# Patient Record
Sex: FEMALE | Race: WHITE | Employment: UNEMPLOYED | URBAN - METROPOLITAN AREA
[De-identification: names, ages, dates, MRNs, and addresses within clinical notes are randomized per-mention and may not be internally consistent; named-entity substitution may affect disease eponyms.]

---

## 2018-11-06 ENCOUNTER — HOSPITAL ENCOUNTER (EMERGENCY)
Age: 23
Discharge: HOME | End: 2018-11-07
Attending: EMERGENCY MEDICINE
Payer: COMMERCIAL

## 2018-11-06 VITALS
DIASTOLIC BLOOD PRESSURE: 62 MMHG | WEIGHT: 95 LBS | SYSTOLIC BLOOD PRESSURE: 98 MMHG | HEART RATE: 71 BPM | BODY MASS INDEX: 18.65 KG/M2 | TEMPERATURE: 98 F | HEIGHT: 60 IN | RESPIRATION RATE: 18 BRPM | OXYGEN SATURATION: 98 %

## 2018-11-06 DIAGNOSIS — N30.00 ACUTE CYSTITIS WITHOUT HEMATURIA: ICD-10-CM

## 2018-11-06 DIAGNOSIS — R42 LIGHTHEADEDNESS: Primary | ICD-10-CM

## 2018-11-06 PROCEDURE — 84703 CHORIONIC GONADOTROPIN ASSAY: CPT

## 2018-11-06 PROCEDURE — 96360 HYDRATION IV INFUSION INIT: CPT

## 2018-11-06 PROCEDURE — 99284 EMERGENCY DEPT VISIT MOD MDM: CPT

## 2018-11-06 PROCEDURE — 85025 COMPLETE CBC W/AUTO DIFF WBC: CPT

## 2018-11-06 PROCEDURE — 93005 ELECTROCARDIOGRAM TRACING: CPT | Performed by: EMERGENCY MEDICINE

## 2018-11-06 PROCEDURE — 80053 COMPREHEN METABOLIC PANEL: CPT

## 2018-11-06 PROCEDURE — 2580000003 HC RX 258: Performed by: NURSE PRACTITIONER

## 2018-11-06 RX ORDER — 0.9 % SODIUM CHLORIDE 0.9 %
1000 INTRAVENOUS SOLUTION INTRAVENOUS ONCE
Status: COMPLETED | OUTPATIENT
Start: 2018-11-06 | End: 2018-11-07

## 2018-11-06 RX ADMIN — SODIUM CHLORIDE 1000 ML: 9 INJECTION, SOLUTION INTRAVENOUS at 23:59

## 2018-11-07 LAB
A/G RATIO: 1.8 (ref 1.1–2.2)
ALBUMIN SERPL-MCNC: 4.4 G/DL (ref 3.4–5)
ALP BLD-CCNC: 66 U/L (ref 40–129)
ALT SERPL-CCNC: 14 U/L (ref 10–40)
ANION GAP SERPL CALCULATED.3IONS-SCNC: 8 MMOL/L (ref 3–16)
AST SERPL-CCNC: 18 U/L (ref 15–37)
BACTERIA: ABNORMAL /HPF
BASOPHILS ABSOLUTE: 0 K/UL (ref 0–0.2)
BASOPHILS RELATIVE PERCENT: 0.4 %
BILIRUB SERPL-MCNC: 0.3 MG/DL (ref 0–1)
BILIRUBIN URINE: NEGATIVE
BLOOD, URINE: ABNORMAL
BUN BLDV-MCNC: 12 MG/DL (ref 7–20)
CALCIUM SERPL-MCNC: 8.8 MG/DL (ref 8.3–10.6)
CHLORIDE BLD-SCNC: 102 MMOL/L (ref 99–110)
CLARITY: CLEAR
CO2: 29 MMOL/L (ref 21–32)
COLOR: YELLOW
CREAT SERPL-MCNC: 0.6 MG/DL (ref 0.6–1.1)
EKG ATRIAL RATE: 74 BPM
EKG DIAGNOSIS: NORMAL
EKG P AXIS: 51 DEGREES
EKG P-R INTERVAL: 140 MS
EKG Q-T INTERVAL: 374 MS
EKG QRS DURATION: 84 MS
EKG QTC CALCULATION (BAZETT): 415 MS
EKG R AXIS: 33 DEGREES
EKG T AXIS: 30 DEGREES
EKG VENTRICULAR RATE: 74 BPM
EOSINOPHILS ABSOLUTE: 0.1 K/UL (ref 0–0.6)
EOSINOPHILS RELATIVE PERCENT: 0.9 %
GFR AFRICAN AMERICAN: >60
GFR NON-AFRICAN AMERICAN: >60
GLOBULIN: 2.4 G/DL
GLUCOSE BLD-MCNC: 83 MG/DL (ref 70–99)
GLUCOSE URINE: NEGATIVE MG/DL
HCG QUALITATIVE: NEGATIVE
HCT VFR BLD CALC: 41.7 % (ref 36–48)
HEMOGLOBIN: 14 G/DL (ref 12–16)
KETONES, URINE: NEGATIVE MG/DL
LEUKOCYTE ESTERASE, URINE: NEGATIVE
LYMPHOCYTES ABSOLUTE: 1.6 K/UL (ref 1–5.1)
LYMPHOCYTES RELATIVE PERCENT: 15.5 %
MCH RBC QN AUTO: 30.2 PG (ref 26–34)
MCHC RBC AUTO-ENTMCNC: 33.6 G/DL (ref 31–36)
MCV RBC AUTO: 89.9 FL (ref 80–100)
MICROSCOPIC EXAMINATION: YES
MONOCYTES ABSOLUTE: 0.5 K/UL (ref 0–1.3)
MONOCYTES RELATIVE PERCENT: 4.8 %
MUCUS: ABNORMAL /LPF
NEUTROPHILS ABSOLUTE: 8.2 K/UL (ref 1.7–7.7)
NEUTROPHILS RELATIVE PERCENT: 78.4 %
NITRITE, URINE: NEGATIVE
PDW BLD-RTO: 13.3 % (ref 12.4–15.4)
PH UA: 6
PLATELET # BLD: 255 K/UL (ref 135–450)
PMV BLD AUTO: 8.2 FL (ref 5–10.5)
POTASSIUM SERPL-SCNC: 3.8 MMOL/L (ref 3.5–5.1)
PROTEIN UA: NEGATIVE MG/DL
RBC # BLD: 4.64 M/UL (ref 4–5.2)
RBC UA: ABNORMAL /HPF (ref 0–2)
SODIUM BLD-SCNC: 139 MMOL/L (ref 136–145)
SPECIFIC GRAVITY UA: >=1.03
TOTAL PROTEIN: 6.8 G/DL (ref 6.4–8.2)
URINE REFLEX TO CULTURE: YES
URINE TYPE: ABNORMAL
UROBILINOGEN, URINE: 0.2 E.U./DL
WBC # BLD: 10.4 K/UL (ref 4–11)
WBC UA: ABNORMAL /HPF (ref 0–5)

## 2018-11-07 PROCEDURE — 87186 SC STD MICRODIL/AGAR DIL: CPT

## 2018-11-07 PROCEDURE — 87086 URINE CULTURE/COLONY COUNT: CPT

## 2018-11-07 PROCEDURE — 87077 CULTURE AEROBIC IDENTIFY: CPT

## 2018-11-07 PROCEDURE — 93010 ELECTROCARDIOGRAM REPORT: CPT | Performed by: INTERNAL MEDICINE

## 2018-11-07 PROCEDURE — 81001 URINALYSIS AUTO W/SCOPE: CPT

## 2018-11-07 PROCEDURE — 6370000000 HC RX 637 (ALT 250 FOR IP): Performed by: EMERGENCY MEDICINE

## 2018-11-07 RX ORDER — NITROFURANTOIN 25; 75 MG/1; MG/1
100 CAPSULE ORAL 2 TIMES DAILY
Qty: 10 CAPSULE | Refills: 0 | Status: SHIPPED | OUTPATIENT
Start: 2018-11-07 | End: 2018-11-12

## 2018-11-07 RX ORDER — NITROFURANTOIN 25; 75 MG/1; MG/1
100 CAPSULE ORAL ONCE
Status: COMPLETED | OUTPATIENT
Start: 2018-11-07 | End: 2018-11-07

## 2018-11-07 RX ADMIN — NITROFURANTOIN (MONOHYDRATE/MACROCRYSTALS) 100 MG: 75; 25 CAPSULE ORAL at 01:20

## 2018-11-07 ASSESSMENT — ENCOUNTER SYMPTOMS
ABDOMINAL PAIN: 0
COUGH: 0
VOMITING: 0
BACK PAIN: 0
NAUSEA: 1
WHEEZING: 0
DIARRHEA: 0
COLOR CHANGE: 0
SHORTNESS OF BREATH: 0

## 2018-11-07 NOTE — ED PROVIDER NOTES
normal mood and affect. Her behavior is normal.   Nursing note and vitals reviewed. MEDICAL DECISION MAKING    Vitals:    Vitals:    11/06/18 2252   BP: 98/62   Pulse: 71   Resp: 18   Temp: 98 °F (36.7 °C)   TempSrc: Oral   SpO2: 98%   Weight: 95 lb (43.1 kg)   Height: 5' (1.524 m)       LABS:  Labs Reviewed   CBC WITH AUTO DIFFERENTIAL - Abnormal; Notable for the following:        Result Value    Neutrophils # 8.2 (*)     All other components within normal limits    Narrative:     Performed at:  [place] [address] [address]   Phone [phone]   URINE RT REFLEX TO CULTURE - Abnormal; Notable for the following:     Blood, Urine SMALL (*)     All other components within normal limits    Narrative:     Performed at:  [place] [address] [address]   Phone [phone]   MICROSCOPIC URINALYSIS - Abnormal; Notable for the following:     Mucus, UA 4+ (*)     WBC, UA 10-20 (*)     RBC, UA 3-5 (*)     Bacteria, UA 2+ (*)     All other components within normal limits    Narrative:     Performed at:  [place] [address] [address]   Phone [phone]   URINE CULTURE   COMPREHENSIVE METABOLIC PANEL    Narrative:     Performed at:  [place] [address] [address]   Phone [phone]   HCG, SERUM, QUALITATIVE    Narrative:     Performed at:  [place] [address] [address]   Phone  of labs reviewed and werenegative at this time or not returned at the time of this note.     RADIOLOGY:   Non-plain film images such as CT, Ultrasound and MRI are read by the radiologist. Coleen GALARZA APRN - CNP have directly visualized the radiologic plain film image(s) with the below findings:        Interpretation per the Radiologist

## 2018-11-09 LAB
ORGANISM: ABNORMAL
URINE CULTURE, ROUTINE: ABNORMAL
URINE CULTURE, ROUTINE: ABNORMAL

## 2020-02-26 ENCOUNTER — HOSPITAL ENCOUNTER (EMERGENCY)
Age: 25
Discharge: HOME | End: 2020-02-26
Attending: EMERGENCY MEDICINE
Payer: COMMERCIAL

## 2020-02-26 VITALS
BODY MASS INDEX: 20.62 KG/M2 | OXYGEN SATURATION: 98 % | HEIGHT: 60 IN | WEIGHT: 105 LBS | TEMPERATURE: 98.7 F | SYSTOLIC BLOOD PRESSURE: 132 MMHG | RESPIRATION RATE: 16 BRPM | HEART RATE: 81 BPM | DIASTOLIC BLOOD PRESSURE: 62 MMHG

## 2020-02-26 LAB
EKG ATRIAL RATE: 71 BPM
EKG DIAGNOSIS: NORMAL
EKG P AXIS: 51 DEGREES
EKG P-R INTERVAL: 138 MS
EKG Q-T INTERVAL: 374 MS
EKG QRS DURATION: 80 MS
EKG QTC CALCULATION (BAZETT): 406 MS
EKG R AXIS: 6 DEGREES
EKG T AXIS: 19 DEGREES
EKG VENTRICULAR RATE: 71 BPM
HCG(URINE) PREGNANCY TEST: NEGATIVE

## 2020-02-26 PROCEDURE — 99284 EMERGENCY DEPT VISIT MOD MDM: CPT

## 2020-02-26 PROCEDURE — 84703 CHORIONIC GONADOTROPIN ASSAY: CPT

## 2020-02-26 PROCEDURE — 93005 ELECTROCARDIOGRAM TRACING: CPT | Performed by: EMERGENCY MEDICINE

## 2020-02-26 PROCEDURE — 93010 ELECTROCARDIOGRAM REPORT: CPT | Performed by: INTERNAL MEDICINE

## 2020-02-26 ASSESSMENT — ENCOUNTER SYMPTOMS
WHEEZING: 0
VOICE CHANGE: 0
ABDOMINAL PAIN: 0
SHORTNESS OF BREATH: 0
TROUBLE SWALLOWING: 0
COLOR CHANGE: 0
STRIDOR: 0
NAUSEA: 0
FACIAL SWELLING: 0
VOMITING: 0

## 2020-02-26 NOTE — ED PROVIDER NOTES
[facility]  ED  eMERGENCY dEPARTMENT eNCOUnter      Pt Name: Nicolás Aguero  MRN: [id number]  [place] 1995  Date of evaluation: 2/26/2020  Provider: Yonathan Lima MD    CHIEF COMPLAINT       Chief Complaint   Patient presents with    Other     Reports she thinks she was \"exposed to chemicals\". Smelled sulfar at a friends house the past 2 days. Denies any SOB, changes in mental status. HISTORY OF PRESENT ILLNESS   (Location/Symptom, Timing/Onset, Context/Setting, Quality, Duration, Modifying Factors, Severity)  Note limiting factors. Nicolás Aguero is a 27 y.o. female history of previous ED visit for lightheadedness approximately year and half ago who presents for concern that she was exposed to chemicals for the past 2 days. She reports that her friend's house has a septic system and is not hooked up to the sore system of the city. She reports she has noticed a bad sulfur smell in her friend's basement for the past 2 days. She denies staying over or spending substantial time there but reports she did spend some time there 2 days ago and noticed a strong bad smell. She reports that her friend has no symptoms. She reports that she did have some intermittent lightheadedness while she was smelling sulfur smell but that it resolved when she left and she is not currently lightheaded. Denies any shortness of breath, cough, chest pain. Denies any confusion or altered mental status. She denies any other symptoms at this time. She reports her symptoms were mild, sudden onset, and resolved with leaving her friend's house. HPI    Nursing Notes were reviewed. REVIEW OFSYSTEMS    (2-9 systems for level 4, 10 or more for level 5)     Review of Systems   Constitutional: Negative for appetite change, fever and unexpected weight change. HENT: Negative for facial swelling, trouble swallowing and voice change. Eyes: Negative for visual disturbance.    Respiratory: Negative for RESULTS     EKG:All EKG's are interpreted by the Emergency Department Physician who either signs or Co-signs this chart in the absence of a cardiologist.    The Ekg interpreted by me shows  sinus arrhythmia with a rate of 71  Axis is   Normal  QTc is  406ms  Intervals and Durations are unremarkable. ST Segments: Resolution of PVCs with no acute ST changes from previous EKG  Resolution of PVCs with no significant changes are new signs of ischemia from previous EKG on 11/6/2018        LABS:  Labs Reviewed   PREGNANCY, URINE    Narrative:     Performed at:  [place] [address] [email or web address]   Phone [phone]       All otherlabs were within normal range or not returned as of this dictation. EMERGENCY DEPARTMENT COURSE and DIFFERENTIAL DIAGNOSIS/MDM:   Vitals:    Vitals:    02/26/20 1234 02/26/20 1317   BP: 113/68 132/62   Pulse: 102 81   Resp: 17 16   Temp: 98.7 °F (37.1 °C)    TempSrc: Oral    SpO2: 96% 98%   Weight: 105 lb (47.6 kg)    Height: 5' (1.524 m)          MDM  Patient is afebrile, nontoxic-appearing, no acute distress. Normal work of breathing. Very mild tachycardia at 102 noted at triage however when I examined the patient in the room she has a normal sinus rhythm with no signs of tachycardia. Patient's neurologic exam is completely normal and she denies any dizziness with standing and ambulating. No signs of dysrhythmia on EKG. I do not suspect acute chemical pneumonitis, life-threatening toxic exposure, or other emergent medical pathology at this time. I have advised her to have her friend have her septic system checked and to avoid the area if the strong smell lingers. Primary care follow-up is recommended. Standard ER return precautions are discussed. The patient expresses understanding and agreement with this plan and is discharged home. Procedures    FINAL IMPRESSION      1.  Chemical exposure          DISPOSITION/PLAN DISPOSITION Decision To Discharge 02/26/2020 01:31:40 PM      PATIENT REFERRED TO:  Ace Palomino MD  [address] [address]  [phone]    In 1 week      [facility]  ED  [address] [place] [address]  [phone]    If symptoms worsen        (Please note that portions of this note were completed with a voice recognition program.  Efforts were made to edit the dictations but occasionally words aremis-transcribed. )    Arya Hanna MD (electronically signed)  Attending Emergency Physician         Arya Hanna MD  02/26/20 3319

## 2021-03-03 ENCOUNTER — APPOINTMENT (OUTPATIENT)
Dept: GENERAL RADIOLOGY | Age: 26
End: 2021-03-03
Payer: COMMERCIAL

## 2021-03-03 ENCOUNTER — HOSPITAL ENCOUNTER (EMERGENCY)
Age: 26
Discharge: HOME | End: 2021-03-03
Payer: COMMERCIAL

## 2021-03-03 VITALS
TEMPERATURE: 98 F | HEART RATE: 87 BPM | SYSTOLIC BLOOD PRESSURE: 121 MMHG | RESPIRATION RATE: 16 BRPM | WEIGHT: 95 LBS | HEIGHT: 60 IN | BODY MASS INDEX: 18.65 KG/M2 | OXYGEN SATURATION: 96 % | DIASTOLIC BLOOD PRESSURE: 72 MMHG

## 2021-03-03 DIAGNOSIS — R60.0 LEG EDEMA: Primary | ICD-10-CM

## 2021-03-03 DIAGNOSIS — N30.01 ACUTE CYSTITIS WITH HEMATURIA: ICD-10-CM

## 2021-03-03 LAB
A/G RATIO: 1.6 (ref 1.1–2.2)
ALBUMIN SERPL-MCNC: 4.4 G/DL (ref 3.4–5)
ALP BLD-CCNC: 97 U/L (ref 40–129)
ALT SERPL-CCNC: 15 U/L (ref 10–40)
AMORPHOUS: ABNORMAL /HPF
ANION GAP SERPL CALCULATED.3IONS-SCNC: 7 MMOL/L (ref 3–16)
AST SERPL-CCNC: 23 U/L (ref 15–37)
BACTERIA: ABNORMAL /HPF
BASOPHILS ABSOLUTE: 0.1 K/UL (ref 0–0.2)
BASOPHILS RELATIVE PERCENT: 1 %
BILIRUB SERPL-MCNC: <0.2 MG/DL (ref 0–1)
BILIRUBIN URINE: NEGATIVE
BLOOD, URINE: ABNORMAL
BUN BLDV-MCNC: 11 MG/DL (ref 7–20)
CALCIUM SERPL-MCNC: 9.3 MG/DL (ref 8.3–10.6)
CHLORIDE BLD-SCNC: 102 MMOL/L (ref 99–110)
CLARITY: CLEAR
CO2: 28 MMOL/L (ref 21–32)
COLOR: YELLOW
CREAT SERPL-MCNC: 0.6 MG/DL (ref 0.6–1.1)
EOSINOPHILS ABSOLUTE: 0.3 K/UL (ref 0–0.6)
EOSINOPHILS RELATIVE PERCENT: 4 %
EPITHELIAL CELLS, UA: ABNORMAL /HPF (ref 0–5)
GFR AFRICAN AMERICAN: >60
GFR NON-AFRICAN AMERICAN: >60
GLOBULIN: 2.7 G/DL
GLUCOSE BLD-MCNC: 77 MG/DL (ref 70–99)
GLUCOSE URINE: NEGATIVE MG/DL
HCG QUALITATIVE: NEGATIVE
HCG(URINE) PREGNANCY TEST: NEGATIVE
HCT VFR BLD CALC: 36.6 % (ref 36–48)
HEMOGLOBIN: 12.2 G/DL (ref 12–16)
KETONES, URINE: NEGATIVE MG/DL
LEUKOCYTE ESTERASE, URINE: NEGATIVE
LYMPHOCYTES ABSOLUTE: 2.7 K/UL (ref 1–5.1)
LYMPHOCYTES RELATIVE PERCENT: 33.3 %
MCH RBC QN AUTO: 28.9 PG (ref 26–34)
MCHC RBC AUTO-ENTMCNC: 33.3 G/DL (ref 31–36)
MCV RBC AUTO: 86.9 FL (ref 80–100)
MICROSCOPIC EXAMINATION: YES
MONOCYTES ABSOLUTE: 0.5 K/UL (ref 0–1.3)
MONOCYTES RELATIVE PERCENT: 6.3 %
NEUTROPHILS ABSOLUTE: 4.5 K/UL (ref 1.7–7.7)
NEUTROPHILS RELATIVE PERCENT: 55.4 %
NITRITE, URINE: POSITIVE
PDW BLD-RTO: 13.8 % (ref 12.4–15.4)
PH UA: 7 (ref 5–8)
PLATELET # BLD: 267 K/UL (ref 135–450)
PMV BLD AUTO: 8.1 FL (ref 5–10.5)
POTASSIUM REFLEX MAGNESIUM: 4.1 MMOL/L (ref 3.5–5.1)
PRO-BNP: 304 PG/ML (ref 0–124)
PROTEIN UA: NEGATIVE MG/DL
RBC # BLD: 4.21 M/UL (ref 4–5.2)
RBC UA: ABNORMAL /HPF (ref 0–4)
SEDIMENTATION RATE, ERYTHROCYTE: 11 MM/HR (ref 0–20)
SODIUM BLD-SCNC: 137 MMOL/L (ref 136–145)
SPECIFIC GRAVITY UA: 1.02 (ref 1–1.03)
TOTAL PROTEIN: 7.1 G/DL (ref 6.4–8.2)
URINE TYPE: ABNORMAL
UROBILINOGEN, URINE: 0.2 E.U./DL
WBC # BLD: 8.2 K/UL (ref 4–11)
WBC UA: ABNORMAL /HPF (ref 0–5)

## 2021-03-03 PROCEDURE — 99285 EMERGENCY DEPT VISIT HI MDM: CPT

## 2021-03-03 PROCEDURE — 85025 COMPLETE CBC W/AUTO DIFF WBC: CPT

## 2021-03-03 PROCEDURE — 84703 CHORIONIC GONADOTROPIN ASSAY: CPT

## 2021-03-03 PROCEDURE — 6370000000 HC RX 637 (ALT 250 FOR IP): Performed by: PHYSICIAN ASSISTANT

## 2021-03-03 PROCEDURE — 81001 URINALYSIS AUTO W/SCOPE: CPT

## 2021-03-03 PROCEDURE — 85652 RBC SED RATE AUTOMATED: CPT

## 2021-03-03 PROCEDURE — 87086 URINE CULTURE/COLONY COUNT: CPT

## 2021-03-03 PROCEDURE — 86140 C-REACTIVE PROTEIN: CPT

## 2021-03-03 PROCEDURE — 83880 ASSAY OF NATRIURETIC PEPTIDE: CPT

## 2021-03-03 PROCEDURE — 71045 X-RAY EXAM CHEST 1 VIEW: CPT

## 2021-03-03 PROCEDURE — 80053 COMPREHEN METABOLIC PANEL: CPT

## 2021-03-03 RX ORDER — IBUPROFEN 600 MG/1
600 TABLET ORAL
Qty: 30 TABLET | Refills: 0 | Status: SHIPPED | OUTPATIENT
Start: 2021-03-03

## 2021-03-03 RX ORDER — CEFUROXIME AXETIL 250 MG/1
500 TABLET ORAL ONCE
Status: COMPLETED | OUTPATIENT
Start: 2021-03-03 | End: 2021-03-03

## 2021-03-03 RX ORDER — IBUPROFEN 600 MG/1
600 TABLET ORAL ONCE
Status: COMPLETED | OUTPATIENT
Start: 2021-03-03 | End: 2021-03-03

## 2021-03-03 RX ORDER — CEFUROXIME AXETIL 250 MG/1
250 TABLET ORAL 2 TIMES DAILY
Qty: 14 TABLET | Refills: 0 | Status: SHIPPED | OUTPATIENT
Start: 2021-03-03 | End: 2021-03-10

## 2021-03-03 RX ORDER — ACETAMINOPHEN 500 MG
1000 TABLET ORAL ONCE
Status: COMPLETED | OUTPATIENT
Start: 2021-03-03 | End: 2021-03-03

## 2021-03-03 RX ADMIN — ACETAMINOPHEN 1000 MG: 500 TABLET ORAL at 20:56

## 2021-03-03 RX ADMIN — IBUPROFEN 600 MG: 600 TABLET ORAL at 20:56

## 2021-03-03 RX ADMIN — CEFUROXIME AXETIL 500 MG: 250 TABLET ORAL at 22:29

## 2021-03-03 ASSESSMENT — PAIN DESCRIPTION - PAIN TYPE: TYPE: ACUTE PAIN

## 2021-03-03 ASSESSMENT — PAIN SCALES - GENERAL
PAINLEVEL_OUTOF10: 9
PAINLEVEL_OUTOF10: 8
PAINLEVEL_OUTOF10: 9

## 2021-03-03 ASSESSMENT — PAIN DESCRIPTION - PROGRESSION: CLINICAL_PROGRESSION: GRADUALLY WORSENING

## 2021-03-03 ASSESSMENT — PAIN DESCRIPTION - DESCRIPTORS: DESCRIPTORS: BURNING

## 2021-03-03 ASSESSMENT — PAIN DESCRIPTION - LOCATION: LOCATION: LEG

## 2021-03-03 ASSESSMENT — PAIN DESCRIPTION - ONSET: ONSET: PROGRESSIVE

## 2021-03-04 LAB
C-REACTIVE PROTEIN: <3 MG/L (ref 0–5.1)
URINE CULTURE, ROUTINE: NORMAL

## 2021-03-04 NOTE — ED NOTES
Pt scripts x1 instructed to follow up with PCP. Assessed per Iraida GALAN.      Brigitte Munoz, KRISTEL  38/16/26 [id number]

## 2021-03-04 NOTE — ED PROVIDER NOTES
[facility]  ED  EMERGENCY DEPARTMENT ENCOUNTER        Pt Name: Percy Garcia  MRN: [id number]  [place] 1995  Date of evaluation: 3/3/2021  Provider: Meghan Champagne PA-C  PCP: Ross Phoenix MD    LILLIE. I have evaluated this patient. My supervising physician was available for consultation. Michelle Waller COMPLAINT       Chief Complaint   Patient presents with    Leg Swelling     Pt reports feet/ ankle bilaterally swelling and increasing pain. Pt reports difficulty ambulating. Pt ambulated into ED/ waiting room. HISTORY OF PRESENT ILLNESS   (Location, Timing/Onset, Context/Setting, Quality, Duration, Modifying Factors, Severity, Associated Signs and Symptoms)  Note limiting factors. Percy Garcia is a 23 y.o. female patient presenting with complaint of swelling of her lower legs and feet occurring initially about 3 weeks ago lasting less than a week. This is reoccurred over the past 24 hours. States it comes and goes. States when the swelling goes down she has pain both feet. States her feet are a bit dry with regard to texture and the skin tone. There is no erythema. No trauma. No fevers or chills. No other related complaints. No shortness of breath or chest pain. Nursing Notes were all reviewed and agreed with or any disagreements were addressed in the HPI. REVIEW OF SYSTEMS    (2-9 systems for level 4, 10 or more for level 5)     Review of Systems    Positives and Pertinent negatives as per HPI. Except as noted above in the ROS, all other systems were reviewed and negative. PAST MEDICAL HISTORY     Past Medical History:   Diagnosis Date    Depression          SURGICAL HISTORY     Past Surgical History:   Procedure Laterality Date    MOUTH SURGERY           CURRENTMEDICATIONS       Discharge Medication List as of 3/3/2021 10:26 PM            ALLERGIES     Patient has no known allergies. FAMILYHISTORY     History reviewed.  No place, and time. Mental status is at baseline. Psychiatric:         Mood and Affect: Mood normal.         Behavior: Behavior normal.         Thought Content:  Thought content normal.         Judgment: Judgment normal.         DIAGNOSTIC RESULTS   LABS:    Labs Reviewed   URINALYSIS - Abnormal; Notable for the following components:       Result Value    Blood, Urine MODERATE (*)     Nitrite, Urine POSITIVE (*)     All other components within normal limits    Narrative:     Performed at:  [address] [place] [address] [facility]   Phone [phone]   BRAIN NATRIURETIC PEPTIDE - Abnormal; Notable for the following components:    Pro- (*)     All other components within normal limits    Narrative:     Performed at:  [place] [address] [facility]   Phone [phone]   MICROSCOPIC URINALYSIS - Abnormal; Notable for the following components:    WBC, UA 10-20 (*)     Bacteria, UA 1+ (*)     All other components within normal limits    Narrative:     Performed at:  [address] [place] [address] [facility]   Phone [phone]   CULTURE, URINE   PREGNANCY, URINE    Narrative:     Performed at:  [address] [place] [address] [facility]   Phone [phone]   HCG, SERUM, QUALITATIVE    Narrative:     Performed at:  [place] [address] [facility]   Phone [phone]   COMPREHENSIVE METABOLIC PANEL W/ REFLEX TO MG FOR LOW K    Narrative:     Performed at:  [place] [address] [facility]   Phone [phone]   CBC WITH AUTO DIFFERENTIAL    Narrative:     Performed at:  [address] [place] [address] [facility]   Phone [phone]   SEDIMENTATION RATE    Narrative:     Performed at:  [address],  [place], [address]   Phone [phone]   C-REACTIVE PROTEIN    Narrative:     Performed at:  [facility]  [address]   Phone [phone]       All other labs were within normal range or not returned as of this dictation. EKG: All EKG's are interpreted by the Emergency Department Physician in the absence of a cardiologist.  Please see their note for interpretation of EKG. RADIOLOGY:   Non-plain film images such as CT, Ultrasound and MRI are read by the radiologist. Plain radiographic images are visualized and preliminarily interpreted by the ED Provider with the below findings:        Interpretation per the Radiologist below, if available at the time of this note:    XR CHEST PORTABLE   Final Result   1. No acute abnormality. Xr Chest Portable    Result Date: 3/3/2021  EXAMINATION: ONE XRAY VIEW OF THE CHEST 3/3/2021 9:50 pm COMPARISON: None available. HISTORY: ORDERING SYSTEM PROVIDED HISTORY: Extremity swelling, elevated BNP, evaluate cardiac silhouette TECHNOLOGIST PROVIDED HISTORY: Reason for exam:->Extremity swelling, elevated BNP, evaluate cardiac silhouette Reason for Exam: Leg Swelling (Pt reports feet/ ankle bilaterally swelling and increasing pain. Pt reports difficulty ambulating. Pt ambulated into ED/ waiting room.) FINDINGS: The lungs are clear. Calcified granulomatous disease is noted. The cardiac silhouette is within normal limits. There is no pneumothorax or pleural effusion. 1.  No acute abnormality.            PROCEDURES   Unless otherwise noted below, none     Procedures    CRITICAL CARE TIME   N/A    CONSULTS:  None      EMERGENCY DEPARTMENT COURSE and DIFFERENTIAL DIAGNOSIS/MDM:   Vitals:    Vitals:    03/03/21 1839 03/03/21 1855 03/03/21 2050 03/03/21 2230   BP:  122/82 113/79 121/72   Pulse: 107 74  87   Resp:  16 20 16   Temp:  98 °F (36.7 °C)     TempSrc:  Oral     SpO2: 100% 99% 100% 96%   Weight:  95 lb (43.1 kg)     Height:  5' (1.524 m)         Patient was given the following medications:  Medications   acetaminophen (TYLENOL) tablet 1,000 mg (1,000 mg Oral Given 3/3/21 2056)   ibuprofen (ADVIL;MOTRIN) tablet 600 mg (600 mg Oral Given 3/3/21 2056)   cefUROXime (CEFTIN) tablet 500 mg (500 mg Oral Given 3/3/21 2229)           The patient does have incidental finding of UTI. The patient is given Ceftin 500 mg initial dose. No sent home with continuation Ceftin 250 mg twice daily for the next 7 days. She was given Tylenol and ibuprofen for pain control. Chest x-ray obtained showed no evidence of cardiomegaly. No other cardiopulmonary abnormality noted. The patient's serum hCG is negative. The patient's CMP shows normal renal and hepatic function. The patient's WBC 8.2 and hemoglobin 12.2. The patient sed rate 11 and CRP less than 3. The patient proBNP 304 which is within range for being age-appropriate. UA is positive for UTI. At this time I do not have a clear understanding as to the reason for the patient's lower extremity swelling complaint. This is not pretibial edema. It was a bit puffy on my exam.  I found no sign of cellulitis this. Antibiotics are not indicated for cellulitic process. However she is on antibiotics for the UTI. This may benefit? .  Regarding the slight elevation proBNP I would like her to follow-up with her PCP regarding that finding. She may need further evaluation regarding her lower extremity edema. I do not believe that she has heart failure at this time. FINAL IMPRESSION      1. Leg edema    2.  Acute cystitis with hematuria          DISPOSITION/PLAN   DISPOSITION Decision To Discharge 03/03/2021 10:21:50 PM      PATIENT REFERREDTO:  Michelle Rowe 43  [phone]    Schedule an appointment as soon as possible for a visit on 3/8/2021      [facility]  ED  [address] [facility] [address] [phone]  Go to   If symptoms worsen      DISCHARGE MEDICATIONS:  Discharge Medication List as of 3/3/2021 10:26 PM      START taking these medications    Details   cefUROXime (CEFTIN) 250 MG tablet Take 1 tablet by mouth 2 times daily for 7 days, Disp-14 tablet, R-0Print      ibuprofen (ADVIL;MOTRIN) 600 MG tablet Take 1 tablet by mouth 3 times daily (with meals), Disp-30 tablet, R-0Print             DISCONTINUED MEDICATIONS:  Discharge Medication List as of 3/3/2021 10:26 PM      STOP taking these medications       famotidine (PEPCID) 20 MG tablet Comments:   Reason for Stopping:                      (Please note that portions of this note were completed with a voice recognition program.  Efforts were made to edit the dictations but occasionally words are mis-transcribed. )    John Kovacs PA-C (electronically signed)           John Kovacs PA-C  03/04/21 1424

## 2022-03-18 ENCOUNTER — HOSPITAL ENCOUNTER (EMERGENCY)
Age: 27
Discharge: HOME | End: 2022-03-18
Attending: EMERGENCY MEDICINE
Payer: COMMERCIAL

## 2022-03-18 VITALS
BODY MASS INDEX: 19.63 KG/M2 | HEIGHT: 60 IN | RESPIRATION RATE: 18 BRPM | WEIGHT: 100 LBS | HEART RATE: 101 BPM | TEMPERATURE: 98.4 F | SYSTOLIC BLOOD PRESSURE: 103 MMHG | OXYGEN SATURATION: 100 % | DIASTOLIC BLOOD PRESSURE: 65 MMHG

## 2022-03-18 DIAGNOSIS — E87.6 HYPOKALEMIA: ICD-10-CM

## 2022-03-18 DIAGNOSIS — R19.7 DIARRHEA, UNSPECIFIED TYPE: ICD-10-CM

## 2022-03-18 DIAGNOSIS — R10.30 LOWER ABDOMINAL PAIN: Primary | ICD-10-CM

## 2022-03-18 LAB
A/G RATIO: 1.5 (ref 1.1–2.2)
ALBUMIN SERPL-MCNC: 4.3 G/DL (ref 3.4–5)
ALP BLD-CCNC: 84 U/L (ref 40–129)
ALT SERPL-CCNC: 45 U/L (ref 10–40)
ANION GAP SERPL CALCULATED.3IONS-SCNC: 12 MMOL/L (ref 3–16)
AST SERPL-CCNC: 42 U/L (ref 15–37)
BACTERIA: ABNORMAL /HPF
BASOPHILS ABSOLUTE: 0 K/UL (ref 0–0.2)
BASOPHILS RELATIVE PERCENT: 0.8 %
BILIRUB SERPL-MCNC: 0.3 MG/DL (ref 0–1)
BILIRUBIN URINE: NEGATIVE
BLOOD, URINE: ABNORMAL
BUN BLDV-MCNC: 9 MG/DL (ref 7–20)
CALCIUM SERPL-MCNC: 8.5 MG/DL (ref 8.3–10.6)
CHLORIDE BLD-SCNC: 106 MMOL/L (ref 99–110)
CLARITY: ABNORMAL
CO2: 22 MMOL/L (ref 21–32)
COLOR: YELLOW
CREAT SERPL-MCNC: <0.5 MG/DL (ref 0.6–1.1)
EOSINOPHILS ABSOLUTE: 0 K/UL (ref 0–0.6)
EOSINOPHILS RELATIVE PERCENT: 0.7 %
EPITHELIAL CELLS, UA: ABNORMAL /HPF (ref 0–5)
GFR AFRICAN AMERICAN: >60
GFR NON-AFRICAN AMERICAN: >60
GLUCOSE BLD-MCNC: 108 MG/DL (ref 70–99)
GLUCOSE URINE: NEGATIVE MG/DL
HCG(URINE) PREGNANCY TEST: NEGATIVE
HCT VFR BLD CALC: 40.6 % (ref 36–48)
HEMOGLOBIN: 13.7 G/DL (ref 12–16)
KETONES, URINE: NEGATIVE MG/DL
LEUKOCYTE ESTERASE, URINE: NEGATIVE
LIPASE: 23 U/L (ref 13–60)
LYMPHOCYTES ABSOLUTE: 1.4 K/UL (ref 1–5.1)
LYMPHOCYTES RELATIVE PERCENT: 25.3 %
MAGNESIUM: 1.9 MG/DL (ref 1.8–2.4)
MCH RBC QN AUTO: 28.7 PG (ref 26–34)
MCHC RBC AUTO-ENTMCNC: 33.9 G/DL (ref 31–36)
MCV RBC AUTO: 84.7 FL (ref 80–100)
MICROSCOPIC EXAMINATION: YES
MONOCYTES ABSOLUTE: 0.5 K/UL (ref 0–1.3)
MONOCYTES RELATIVE PERCENT: 9 %
MUCUS: ABNORMAL /LPF
NEUTROPHILS ABSOLUTE: 3.5 K/UL (ref 1.7–7.7)
NEUTROPHILS RELATIVE PERCENT: 64.2 %
NITRITE, URINE: NEGATIVE
PDW BLD-RTO: 15.4 % (ref 12.4–15.4)
PH UA: 5.5 (ref 5–8)
PLATELET # BLD: 218 K/UL (ref 135–450)
PMV BLD AUTO: 7.6 FL (ref 5–10.5)
POTASSIUM REFLEX MAGNESIUM: 3.1 MMOL/L (ref 3.5–5.1)
PROTEIN UA: ABNORMAL MG/DL
RBC # BLD: 4.79 M/UL (ref 4–5.2)
RBC UA: ABNORMAL /HPF (ref 0–4)
SODIUM BLD-SCNC: 140 MMOL/L (ref 136–145)
SPECIFIC GRAVITY UA: >=1.03 (ref 1–1.03)
TOTAL PROTEIN: 7.1 G/DL (ref 6.4–8.2)
URINE REFLEX TO CULTURE: ABNORMAL
URINE TYPE: ABNORMAL
UROBILINOGEN, URINE: 0.2 E.U./DL
WBC # BLD: 5.5 K/UL (ref 4–11)
WBC UA: ABNORMAL /HPF (ref 0–5)

## 2022-03-18 PROCEDURE — 80053 COMPREHEN METABOLIC PANEL: CPT

## 2022-03-18 PROCEDURE — 83735 ASSAY OF MAGNESIUM: CPT

## 2022-03-18 PROCEDURE — 85025 COMPLETE CBC W/AUTO DIFF WBC: CPT

## 2022-03-18 PROCEDURE — 36415 COLL VENOUS BLD VENIPUNCTURE: CPT

## 2022-03-18 PROCEDURE — 6370000000 HC RX 637 (ALT 250 FOR IP): Performed by: EMERGENCY MEDICINE

## 2022-03-18 PROCEDURE — 87086 URINE CULTURE/COLONY COUNT: CPT

## 2022-03-18 PROCEDURE — 83690 ASSAY OF LIPASE: CPT

## 2022-03-18 PROCEDURE — 99285 EMERGENCY DEPT VISIT HI MDM: CPT

## 2022-03-18 PROCEDURE — 84703 CHORIONIC GONADOTROPIN ASSAY: CPT

## 2022-03-18 PROCEDURE — 81001 URINALYSIS AUTO W/SCOPE: CPT

## 2022-03-18 RX ORDER — ONDANSETRON 4 MG/1
4 TABLET, ORALLY DISINTEGRATING ORAL EVERY 8 HOURS PRN
Qty: 10 TABLET | Refills: 0 | Status: SHIPPED | OUTPATIENT
Start: 2022-03-18

## 2022-03-18 RX ORDER — DICYCLOMINE HCL 20 MG
20 TABLET ORAL EVERY 6 HOURS PRN
Qty: 20 TABLET | Refills: 0 | Status: SHIPPED | OUTPATIENT
Start: 2022-03-18

## 2022-03-18 RX ORDER — DICYCLOMINE HYDROCHLORIDE 10 MG/1
20 CAPSULE ORAL ONCE
Status: COMPLETED | OUTPATIENT
Start: 2022-03-18 | End: 2022-03-18

## 2022-03-18 RX ORDER — POTASSIUM CHLORIDE 750 MG/1
40 TABLET, EXTENDED RELEASE ORAL ONCE
Status: COMPLETED | OUTPATIENT
Start: 2022-03-19 | End: 2022-03-18

## 2022-03-18 RX ADMIN — DICYCLOMINE HYDROCHLORIDE 20 MG: 10 CAPSULE ORAL at 22:46

## 2022-03-18 RX ADMIN — POTASSIUM CHLORIDE 40 MEQ: 750 TABLET, EXTENDED RELEASE ORAL at 23:50

## 2022-03-18 ASSESSMENT — PAIN DESCRIPTION - DESCRIPTORS: DESCRIPTORS: SHARP

## 2022-03-18 ASSESSMENT — PAIN DESCRIPTION - ORIENTATION: ORIENTATION: RIGHT;LEFT;LOWER

## 2022-03-18 ASSESSMENT — PAIN SCALES - GENERAL: PAINLEVEL_OUTOF10: 8

## 2022-03-18 ASSESSMENT — PAIN DESCRIPTION - LOCATION: LOCATION: ABDOMEN

## 2022-03-18 ASSESSMENT — PAIN DESCRIPTION - FREQUENCY: FREQUENCY: CONTINUOUS

## 2022-03-18 ASSESSMENT — PAIN - FUNCTIONAL ASSESSMENT: PAIN_FUNCTIONAL_ASSESSMENT: 0-10

## 2022-03-18 ASSESSMENT — PAIN DESCRIPTION - PAIN TYPE: TYPE: ACUTE PAIN

## 2022-03-19 NOTE — ED PROVIDER NOTES
CHIEF COMPLAINT  Abdominal Pain      HISTORY OF PRESENT ILLNESS  Kitty James is a 24 y.o. female presents to the ED with abdominal pain, bilateral lower, states she has had diarrhea, about once a day, looser than normal stools, no melena or hematochezia, just watery, since Monday, 4 days ago, no known fever though she had felt warm a couple days ago. Occasional nausea but no vomiting, states it typically occurs after she eats, gets a gurgling feeling in her lower abdomen and diarrhea, no upper abdominal pain, no chest pain or shortness of breath, no reflux symptoms, no dysuria/hematuria/urgency/frequency, no headache or neck stiffness, denies concern for COVID/flu, declines need for testing today, I had her male friend with her stepped out of the room, she denies any abnormal vaginal discharge, no concern for STDs, no vaginal bleeding, states she has not had a period in a long time, but not concerned with pregnancy, had been on Depo shot for about 9 months, and has not had a period for about 2 years since that time, denies concern for abuse, has had a slight cough/minimal, little congestion, she attributed to allergies with weather change, no other complaints, modifying factors or associated symptoms. I have reviewed the following from the nursing documentation. Past Medical History:   Diagnosis Date    Depression      Past Surgical History:   Procedure Laterality Date    MOUTH SURGERY       History reviewed. No pertinent family history.   Social History     Socioeconomic History    Marital status: Single     Spouse name: Not on file    Number of children: Not on file    Years of education: Not on file    Highest education level: Not on file   Occupational History    Not on file   Tobacco Use    Smoking status: Current Some Day Smoker     Packs/day: 0.50     Types: Cigarettes     Last attempt to quit: 2019     Years since quittin.2    Smokeless tobacco: Never Used   Vaping Use    Vaping Use: Some days    Substances: Nicotine   Substance and Sexual Activity    Alcohol use: No    Drug use: Not on file    Sexual activity: Yes     Partners: Male   Other Topics Concern    Not on file   Social History Narrative    Not on file     Social Determinants of Health     Financial Resource Strain:     Difficulty of Paying Living Expenses: Not on file   Food Insecurity:     Worried About Running Out of Food in the Last Year: Not on file    Dashawn of Food in the Last Year: Not on file   Transportation Needs:     Lack of Transportation (Medical): Not on file    Lack of Transportation (Non-Medical): Not on file   Physical Activity:     Days of Exercise per Week: Not on file    Minutes of Exercise per Session: Not on file   Stress:     Feeling of Stress : Not on file   Social Connections:     Frequency of Communication with Friends and Family: Not on file    Frequency of Social Gatherings with Friends and Family: Not on file    Attends [employer] Services: Not on file    Active Member of [address] [place] [address] or Organizations: Not on file    Attends Club or Organization Meetings: Not on file    Marital Status: Not on file   Intimate Partner Violence:     Fear of Current or Ex-Partner: Not on file    Emotionally Abused: Not on file    Physically Abused: Not on file    Sexually Abused: Not on file   Housing Stability:     Unable to Pay for Housing in the Last Year: Not on file    Number of [place] in the Last Year: Not on file    Unstable Housing in the Last Year: Not on file     No current facility-administered medications for this encounter. Current Outpatient Medications   Medication Sig Dispense Refill    medical marijuana Take by mouth as needed.       dicyclomine (BENTYL) 20 MG tablet Take 1 tablet by mouth every 6 hours as needed (abdominal cramping) 20 tablet 0    ondansetron (ZOFRAN ODT) 4 MG disintegrating tablet Take 1 tablet by mouth every 8 hours as needed for Nausea or Vomiting 10 tablet 0    ibuprofen (ADVIL;MOTRIN) 600 MG tablet Take 1 tablet by mouth 3 times daily (with meals) 30 tablet 0     No Known Allergies    REVIEW OF SYSTEMS  10 systems reviewed, pertinent positives per HPI otherwise noted to be negative. PHYSICAL EXAM  /65   Pulse 101   Temp 98.4 °F (36.9 °C) (Oral)   Resp 18   Ht 5' (1.524 m)   Wt 100 lb (45.4 kg)   SpO2 100%   BMI 19.53 kg/m²   GENERAL APPEARANCE: Awake and alert. Cooperative. No acute distress, very thin   HEAD: Normocephalic. Atraumatic. EYES: PERRL. EOM's grossly intact. ENT: Mucous membranes are moist.  Airway patent, no stridor, no otorrhea or rhinorrhea  NECK: Supple. No rigidity  HEART: RRR. No murmurs  LUNGS: Respirations nonlabored. Lungs are clear to auscultation bilaterally. No wheezes crackles or rhonchi  ABDOMEN: Soft. Non-distended. Mild bilateral lower abdominal tenderness, negative McBurney's point tenderness, negative Rovsing sign, no CVA tenderness, negative Gerardo sign. No guarding or rebound. Hyperactive bowel sounds, no significant organomegaly  EXTREMITIES: No peripheral edema. Moves all extremities equally. SKIN: Warm and dry. No acute rashes. NEUROLOGICAL: Alert and oriented. No gross facial drooping. Normal speech, steady gait  PSYCHIATRIC: Normal mood and affect. LABS  I have reviewed all labs for this visit.    Results for orders placed or performed during the hospital encounter of 03/18/22   Urinalysis with Reflex to Culture    Specimen: Urine   Result Value Ref Range    Color, UA Yellow Straw/Yellow    Clarity, UA SL CLOUDY (A) Clear    Glucose, Ur Negative Negative mg/dL    Bilirubin Urine Negative Negative    Ketones, Urine Negative Negative mg/dL    Specific Gravity, UA >=1.030 1.005 - 1.030    Blood, Urine LARGE (A) Negative    pH, UA 5.5 5.0 - 8.0    Protein, UA TRACE (A) Negative mg/dL    Urobilinogen, Urine 0.2 <2.0 E.U./dL    Nitrite, Urine Negative Negative    Leukocyte Esterase, Urine Negative Negative    Microscopic Examination YES     Urine Type NotGiven     Urine Reflex to Culture Not Indicated    Pregnancy, Urine   Result Value Ref Range    HCG(Urine) Pregnancy Test Negative Detects HCG level >20 MIU/mL   Microscopic Urinalysis   Result Value Ref Range    Mucus, UA 1+ (A) None Seen /LPF    WBC, UA 3-5 0 - 5 /HPF    RBC, UA 21-50 (A) 0 - 4 /HPF    Epithelial Cells, UA 6-10 (A) 0 - 5 /HPF    Bacteria, UA 3+ (A) None Seen /HPF   CBC with Auto Differential   Result Value Ref Range    WBC 5.5 4.0 - 11.0 K/uL    RBC 4.79 4.00 - 5.20 M/uL    Hemoglobin 13.7 12.0 - 16.0 g/dL    Hematocrit 40.6 36.0 - 48.0 %    MCV 84.7 80.0 - 100.0 fL    MCH 28.7 26.0 - 34.0 pg    MCHC 33.9 31.0 - 36.0 g/dL    RDW 15.4 12.4 - 15.4 %    Platelets 343 370 - 434 K/uL    MPV 7.6 5.0 - 10.5 fL    Neutrophils % 64.2 %    Lymphocytes % 25.3 %    Monocytes % 9.0 %    Eosinophils % 0.7 %    Basophils % 0.8 %    Neutrophils Absolute 3.5 1.7 - 7.7 K/uL    Lymphocytes Absolute 1.4 1.0 - 5.1 K/uL    Monocytes Absolute 0.5 0.0 - 1.3 K/uL    Eosinophils Absolute 0.0 0.0 - 0.6 K/uL    Basophils Absolute 0.0 0.0 - 0.2 K/uL   Comprehensive Metabolic Panel w/ Reflex to MG   Result Value Ref Range    Sodium 140 136 - 145 mmol/L    Potassium reflex Magnesium 3.1 (L) 3.5 - 5.1 mmol/L    Chloride 106 99 - 110 mmol/L    CO2 22 21 - 32 mmol/L    Anion Gap 12 3 - 16    Glucose 108 (H) 70 - 99 mg/dL    BUN 9 7 - 20 mg/dL    CREATININE <0.5 (L) 0.6 - 1.1 mg/dL    GFR Non-African American >60 >60    GFR African American >60 >60    Calcium 8.5 8.3 - 10.6 mg/dL    Total Protein 7.1 6.4 - 8.2 g/dL    Albumin 4.3 3.4 - 5.0 g/dL    Albumin/Globulin Ratio 1.5 1.1 - 2.2    Total Bilirubin 0.3 0.0 - 1.0 mg/dL    Alkaline Phosphatase 84 40 - 129 U/L    ALT 45 (H) 10 - 40 U/L    AST 42 (H) 15 - 37 U/L   Lipase   Result Value Ref Range    Lipase 23.0 13.0 - 60.0 U/L   Magnesium   Result Value Ref Range    Magnesium 1.90 1.80 - 2.40 mg/dL       ED Encounter   Procedures    Culture, Urine    Urinalysis with Reflex to Culture    Pregnancy, Urine    Microscopic Urinalysis    CBC with Auto Differential    Comprehensive Metabolic Panel w/ Reflex to MG    Lipase    Magnesium     Orders Placed This Encounter   Medications    dicyclomine (BENTYL) capsule 20 mg    potassium chloride (KLOR-CON M) extended release tablet 40 mEq    dicyclomine (BENTYL) 20 MG tablet     Sig: Take 1 tablet by mouth every 6 hours as needed (abdominal cramping)     Dispense:  20 tablet     Refill:  0    ondansetron (ZOFRAN ODT) 4 MG disintegrating tablet     Sig: Take 1 tablet by mouth every 8 hours as needed for Nausea or Vomiting     Dispense:  10 tablet     Refill:  0          CLINICAL IMPRESSION  1. Lower abdominal pain    2. Diarrhea, unspecified type    3. Hypokalemia        Blood pressure 103/65, pulse 101, temperature 98.4 °F (36.9 °C), temperature source Oral, resp. rate 18, height 5' (1.524 m), weight 100 lb (45.4 kg), SpO2 100 %. DISPOSITION  Dianne Bazzi was discharged to home in stable condition.                    Josh Chavarria DO  03/19/22 9750

## 2022-03-20 LAB — URINE CULTURE, ROUTINE: NORMAL

## 2022-04-16 ENCOUNTER — HOSPITAL ENCOUNTER (EMERGENCY)
Age: 27
Discharge: HOME | End: 2022-04-16
Attending: STUDENT IN AN ORGANIZED HEALTH CARE EDUCATION/TRAINING PROGRAM

## 2022-04-16 VITALS
HEART RATE: 86 BPM | SYSTOLIC BLOOD PRESSURE: 104 MMHG | DIASTOLIC BLOOD PRESSURE: 63 MMHG | TEMPERATURE: 98.4 F | BODY MASS INDEX: 18.65 KG/M2 | WEIGHT: 95 LBS | HEIGHT: 60 IN | OXYGEN SATURATION: 98 %

## 2022-04-16 DIAGNOSIS — Z53.21 PATIENT LEFT WITHOUT BEING SEEN: Primary | ICD-10-CM

## 2022-04-16 NOTE — ED NOTES
Pt sitting in room, sister at bedside. Pt makes minimal eye contact w/ RN, does not verbally respond to questions. Pt refusing all pt care at this time.       Eulalia Emanuel RN  04/16/22 1948

## 2022-04-17 NOTE — ED NOTES
Pt and sister exited room 9. RN questioned if pt is leaving ED. Mother of pt now at bedside, stated only \"yes\". Family exited ED.      Scottie Alarcon RN  04/16/22 2019

## 2022-04-17 NOTE — ED PROVIDER NOTES
Patient had come in for psychiatric evaluation. She had not endorsed any suicidal ideation or homicidal ideation in triage. She had refused laboratory studies and being placed in a gown. Family did not have any concern for suicidality or homicidal ideology. Patient left without being seen.   I did not see this patient was otherwise not involved in her care for     Elpidio Zee MD  04/16/22 2029

## 2022-05-06 ENCOUNTER — HOSPITAL ENCOUNTER (EMERGENCY)
Age: 27
Discharge: LEFT BEFORE DISCHARGE | End: 2022-05-06

## 2022-05-06 VITALS
DIASTOLIC BLOOD PRESSURE: 56 MMHG | RESPIRATION RATE: 20 BRPM | SYSTOLIC BLOOD PRESSURE: 87 MMHG | OXYGEN SATURATION: 100 % | HEART RATE: 75 BPM | WEIGHT: 80 LBS | BODY MASS INDEX: 15.71 KG/M2 | TEMPERATURE: 97.6 F | HEIGHT: 60 IN

## 2022-05-06 DIAGNOSIS — Z53.21 PATIENT LEFT BEFORE EVALUATION BY PHYSICIAN: Primary | ICD-10-CM

## 2022-05-06 ASSESSMENT — PAIN - FUNCTIONAL ASSESSMENT: PAIN_FUNCTIONAL_ASSESSMENT: NONE - DENIES PAIN

## 2022-05-07 NOTE — ED NOTES
Call placed to [facility]; Nurse Amandeep Bailon reports pt came in for S/I but was non-complaint and walked out of facility; family called them concerned, instructed family that they did not have ability to place holds at their facility but they could try to bring her to the ER.       Dante Jimenez RN  05/06/22 2011

## 2024-01-05 LAB
ABO, EXTERNAL RESULT: NORMAL
HEP B, EXTERNAL RESULT: NEGATIVE
HIV, EXTERNAL RESULT: NEGATIVE
RH FACTOR, EXTERNAL RESULT: NEGATIVE
RUBELLA TITER, EXTERNAL RESULT: NORMAL

## 2024-01-22 LAB
C. TRACHOMATIS, EXTERNAL RESULT: NEGATIVE
N. GONORRHOEAE, EXTERNAL RESULT: NEGATIVE

## 2024-02-22 LAB
HEPATITIS C ANTIBODY, EXTERNAL RESULT: NEGATIVE
RPR, EXTERNAL RESULT: NORMAL

## 2024-03-22 ENCOUNTER — APPOINTMENT (OUTPATIENT)
Dept: ULTRASOUND IMAGING | Age: 29
End: 2024-03-22
Payer: COMMERCIAL

## 2024-03-22 ENCOUNTER — HOSPITAL ENCOUNTER (OUTPATIENT)
Age: 29
Discharge: HOME | End: 2024-03-23
Attending: STUDENT IN AN ORGANIZED HEALTH CARE EDUCATION/TRAINING PROGRAM | Admitting: STUDENT IN AN ORGANIZED HEALTH CARE EDUCATION/TRAINING PROGRAM
Payer: COMMERCIAL

## 2024-03-22 LAB
BACTERIA URNS QL MICRO: ABNORMAL /HPF
BILIRUB UR QL STRIP.AUTO: NEGATIVE
CLARITY UR: CLEAR
COLOR UR: YELLOW
EPI CELLS #/AREA URNS HPF: ABNORMAL /HPF (ref 0–5)
GLUCOSE UR STRIP.AUTO-MCNC: NEGATIVE MG/DL
HGB UR QL STRIP.AUTO: ABNORMAL
KETONES UR STRIP.AUTO-MCNC: NEGATIVE MG/DL
LEUKOCYTE ESTERASE UR QL STRIP.AUTO: NEGATIVE
MUCOUS THREADS #/AREA URNS LPF: ABNORMAL /LPF
NITRITE UR QL STRIP.AUTO: NEGATIVE
PH UR STRIP.AUTO: 6.5 [PH] (ref 5–8)
PROT UR STRIP.AUTO-MCNC: NEGATIVE MG/DL
RBC #/AREA URNS HPF: ABNORMAL /HPF (ref 0–4)
SP GR UR STRIP.AUTO: >=1.03 (ref 1–1.03)
UA DIPSTICK W REFLEX MICRO PNL UR: YES
URN SPEC COLLECT METH UR: ABNORMAL
UROBILINOGEN UR STRIP-ACNC: 0.2 E.U./DL
WBC #/AREA URNS HPF: ABNORMAL /HPF (ref 0–5)

## 2024-03-22 PROCEDURE — 87491 CHLMYD TRACH DNA AMP PROBE: CPT

## 2024-03-22 PROCEDURE — 6370000000 HC RX 637 (ALT 250 FOR IP): Performed by: STUDENT IN AN ORGANIZED HEALTH CARE EDUCATION/TRAINING PROGRAM

## 2024-03-22 PROCEDURE — 87591 N.GONORRHOEAE DNA AMP PROB: CPT

## 2024-03-22 PROCEDURE — 76819 FETAL BIOPHYS PROFIL W/O NST: CPT

## 2024-03-22 PROCEDURE — 81001 URINALYSIS AUTO W/SCOPE: CPT

## 2024-03-22 PROCEDURE — 76815 OB US LIMITED FETUS(S): CPT

## 2024-03-22 RX ORDER — NIFEDIPINE 10 MG/1
10 CAPSULE ORAL EVERY 10 MIN PRN
Status: DISCONTINUED | OUTPATIENT
Start: 2024-03-22 | End: 2024-03-23 | Stop reason: HOSPADM

## 2024-03-22 RX ORDER — SODIUM CHLORIDE, SODIUM LACTATE, POTASSIUM CHLORIDE, AND CALCIUM CHLORIDE .6; .31; .03; .02 G/100ML; G/100ML; G/100ML; G/100ML
500 INJECTION, SOLUTION INTRAVENOUS ONCE
Status: COMPLETED | OUTPATIENT
Start: 2024-03-22 | End: 2024-03-23

## 2024-03-22 RX ADMIN — NIFEDIPINE 10 MG: 10 CAPSULE ORAL at 23:16

## 2024-03-22 RX ADMIN — NIFEDIPINE 10 MG: 10 CAPSULE ORAL at 21:40

## 2024-03-23 VITALS
TEMPERATURE: 98.1 F | WEIGHT: 116 LBS | BODY MASS INDEX: 22.78 KG/M2 | HEART RATE: 91 BPM | RESPIRATION RATE: 16 BRPM | HEIGHT: 60 IN | SYSTOLIC BLOOD PRESSURE: 103 MMHG | DIASTOLIC BLOOD PRESSURE: 63 MMHG

## 2024-03-23 PROCEDURE — 96360 HYDRATION IV INFUSION INIT: CPT

## 2024-03-23 PROCEDURE — 96361 HYDRATE IV INFUSION ADD-ON: CPT

## 2024-03-23 PROCEDURE — 2580000003 HC RX 258: Performed by: STUDENT IN AN ORGANIZED HEALTH CARE EDUCATION/TRAINING PROGRAM

## 2024-03-23 PROCEDURE — 99213 OFFICE O/P EST LOW 20 MIN: CPT

## 2024-03-23 RX ORDER — CLOTRIMAZOLE 1 %
CREAM WITH APPLICATOR VAGINAL
Qty: 40 G | Refills: 0 | Status: SHIPPED | OUTPATIENT
Start: 2024-03-23 | End: 2024-03-30

## 2024-03-23 RX ADMIN — SODIUM CHLORIDE, POTASSIUM CHLORIDE, SODIUM LACTATE AND CALCIUM CHLORIDE 500 ML: 600; 310; 30; 20 INJECTION, SOLUTION INTRAVENOUS at 00:30

## 2024-03-23 NOTE — H&P
Department of Obstetrics and Gynecology  Labor and Delivery  Attending Triage Note      SUBJECTIVE:  Stella Lopez 28 yo  at 28w3d presents to triage with complaints of contractions that started around 3 pm. Reports they have increased in frequency and intensity which is what brought her to triage. Reports ctx 1-2 minutes. Initially denied recent intercourse in the past 48 hours, however, after further questioning reports intercourse within the past 24 hours. Reports good fetal movement. Denies dysuria, hematuria, VB, abnormal vaginal discharge, itching irritation.     OBJECTIVE    Vitals:  Vitals:    24   BP: 103/63   Pulse: 91   Resp: 16   Temp: 98.1 °F (36.7 °C)     CONSTITUTIONAL:  awake, alert, cooperative, no apparent distress, and appears stated age  LUNGS:  No increased work of breathing,   CARDIOVASCULAR:  Normal rate  ABDOMEN: Gravid,  soft, non-distended, non-tender, no masses palpated  EXT: No C/C/E  SPECULUM: moderate amount of thin off white discharge on vaginal vault, no additional pooling on valsalva  WET PREP: +2 hyphae, +1 clue cells, sperm, fern negative     Cervix:             Dilation:  fingertip         Effacement:  Long         Station:  -2 cm         Consistency:  medium         Position:  mid    Fetal Position:  Cephalic    Membranes:  Intact    Fetal heart rate:         Baseline Heart Rate:  135 bpm        Accelerations:  present       Decelerations:  occasional variable, 1 nonspecific deceleration       Variability:  moderate    Contraction frequency: 1-2 initially on arrival, stopped following IV hydration    Labs/Imaging:    Urinalysis        Component Value Flag Ref Range Units Status    Color, UA Yellow      Straw/Yellow  Final    Clarity, UA Clear      Clear  Final    Glucose, Ur Negative      Negative mg/dL Final    Bilirubin Urine Negative      Negative  Final    Ketones, Urine Negative      Negative mg/dL Final    Specific Gravity, UA >=1.030      1.005 - 1.030   Final    Blood, Urine TRACE-INTACT      Negative  Final    pH, UA 6.5      5.0 - 8.0  Final    Protein, UA Negative      Negative mg/dL Final    Urobilinogen, Urine 0.2      <2.0 E.U./dL Final    Nitrite, Urine Negative      Negative  Final    Leukocyte Esterase, Urine Negative      Negative  Final    Microscopic Examination YES        Final    Urine Type NotGiven        Final                  Microscopic Urinalysis        Component Value Flag Ref Range Units Status    Mucus, UA 1+      None Seen /LPF Final    WBC, UA 0-2      0 - 5 /HPF Final    RBC, UA 11-20      0 - 4 /HPF Final    Epithelial Cells, UA 2-5      0 - 5 /HPF Final    Bacteria, UA Rare      None Seen /HPF Final                  US FETAL BIOPHYSICAL PROFILE WO NON STRESS TESTING          EXAMINATION:  BIOPHYSICAL PROFILE WITHOUT NON-STRESS TEST; SECOND/THIRD TRIMESTER OBSTETRIC  ULTRASOUND 3/22/2024    TECHNIQUE:  ULTRASOUND BIOPHYSICAL PROFILE WITHOUT NON-STRESS TEST; Transabdominal  second/third trimester obstetric pelvic ultrasound was performed.    COMPARISON:  None.    HISTORY:  ORDERING SYSTEM PROVIDED HISTORY: deceleration  TECHNOLOGIST PROVIDED HISTORY:  Reason for exam:->deceleration  Reason for Exam: decels, contractions; ORDERING SYSTEM PROVIDED HISTORY: ,   contractions  TECHNOLOGIST PROVIDED HISTORY:    Reason for exam:->,  contractions  Reason for Exam: decels, contractions    FINDINGS:  FETAL EVALUATION    Number of Fetuses: Single    Presentation: Breech    Fetal Heart Rate: 135 beats per minutes    Placenta: Anterior and fundal    LUISANA: 18.7 cm    Cervical length: 4.0 cm.    GESTATIONAL AGE:    Provided gestational age: 28 weeks 2 days    Provided HUSAM: 2024    Sonographic gestational age: 28 weeks 1 day    Sonographic HUSAM: 2024    FETAL BIOMETRY:    BPD: 7.1 cm    HC: 26.0 cm    AC: 23.9 cm    FL: 5.2 cm    Estimated fetal weight: 1175 grams, corresponding to 30.4 percentile per  [place].    Two-vessel

## 2024-03-23 NOTE — DISCHARGE INSTRUCTIONS
Home Undelivered Discharge Instructions    After completion of the medical screening evaluation, it has been determined that a medical emergency does not exist and the patient is not in active labor. Therefore, the patient may be discharged home.    After Discharge Orders:            Diet:  normal diet as tolerated    Rest: normal activity as tolerated\        Diet/Activity/Restrictions:  Regular  Limit caffeine consumption  Increase your fluid intake  Eat small meals-but often.    Rise from laying/sitting position to standing slowly.  Avoid laying on your back, sidelying positions are best, left side is preferred.  Weigh yourself daily and write down what you weigh.  If you had a vaginal exam you may have some bloody mucous or brown vaginal discharge.  If your doctor/midwife has ordered antibiotics, get it filled right away and take all of the medication as it has been prescribed.    When to call your doctor:  If you have severe back pain.  Period-like cramps that may come and go.  May feel like baby is balling up.  Low, dull backache, not relieved by rest.  Pressure in your vagina or lower abdomen that may feel like the baby is pushing down.  Change in the type or amount of vaginal discharge.  Abdominal cramps that may be accompanied by diarrhea.  4-5 uterine contractions in one hour.  Fluid leaking from your vagina (may or may not be bloody)  If you have vaginal bleeding.  If you have a temperature of 100.6 or more.  If your baby has a decrease in activity.  Less than 5 times in an hour.  If you feel you are not getting better or you are concerned.  If you develop a headache, not resolved with your usual interventions.  If you have changes in your vision (blurring or spots in your vision).  If you have burning with urination, urgency or frequency.  If you have pain in your abdomen, up by your ribs.                General Instructions:    Nausea and Vomiting  Keep dry toast/crackers with you to munch on  Eat small

## 2024-03-23 NOTE — PROGRESS NOTES
Pt verbalized understanding of verbal and written discharge instructions and denies having questions at this time. Pt left OB unit at 0305 ambulatory, undelivered, and in stable condition, accompanied by FOB. Patient is not in active labor.

## 2024-03-25 LAB
C TRACH DNA CVX QL NAA+PROBE: NEGATIVE
N GONORRHOEA DNA CERV MUCUS QL NAA+PROBE: NEGATIVE

## 2024-05-15 LAB — GBS, EXTERNAL RESULT: POSITIVE

## 2024-06-14 ENCOUNTER — HOSPITAL ENCOUNTER (INPATIENT)
Age: 29
LOS: 2 days | Discharge: HOME | DRG: 560 | End: 2024-06-16
Attending: OBSTETRICS & GYNECOLOGY | Admitting: OBSTETRICS & GYNECOLOGY
Payer: COMMERCIAL

## 2024-06-14 ENCOUNTER — ANESTHESIA (OUTPATIENT)
Dept: LABOR AND DELIVERY | Age: 29
DRG: 560 | End: 2024-06-14
Payer: COMMERCIAL

## 2024-06-14 ENCOUNTER — ANESTHESIA EVENT (OUTPATIENT)
Dept: LABOR AND DELIVERY | Age: 29
DRG: 560 | End: 2024-06-14
Payer: COMMERCIAL

## 2024-06-14 PROBLEM — Z37.9 NORMAL LABOR: Status: ACTIVE | Noted: 2024-06-14

## 2024-06-14 LAB
ABO + RH BLD: NORMAL
AMPHETAMINES UR QL SCN>1000 NG/ML: NORMAL
ANTIBODY SCREEN: NORMAL
BARBITURATES UR QL SCN>200 NG/ML: NORMAL
BASOPHILS # BLD: 0 K/UL (ref 0–0.2)
BASOPHILS NFR BLD: 0.4 %
BENZODIAZ UR QL SCN>200 NG/ML: NORMAL
BLD GP AB SCN SERPL QL: NORMAL
BUPRENORPHINE+NOR UR QL SCN: NORMAL
CANNABINOIDS UR QL SCN>50 NG/ML: NORMAL
COCAINE UR QL SCN: NORMAL
DEPRECATED RDW RBC AUTO: 16 % (ref 12.4–15.4)
DRUG SCREEN COMMENT UR-IMP: NORMAL
EOSINOPHIL # BLD: 0.1 K/UL (ref 0–0.6)
EOSINOPHIL NFR BLD: 1.1 %
FENTANYL SCREEN, URINE: NORMAL
HCT VFR BLD AUTO: 32.7 % (ref 36–48)
HGB BLD-MCNC: 10.7 G/DL (ref 12–16)
LYMPHOCYTES # BLD: 2 K/UL (ref 1–5.1)
LYMPHOCYTES NFR BLD: 21.5 %
MCH RBC QN AUTO: 29.4 PG (ref 26–34)
MCHC RBC AUTO-ENTMCNC: 32.9 G/DL (ref 31–36)
MCV RBC AUTO: 89.6 FL (ref 80–100)
METHADONE UR QL SCN>300 NG/ML: NORMAL
MONOCYTES # BLD: 0.6 K/UL (ref 0–1.3)
MONOCYTES NFR BLD: 6.9 %
NEUTROPHILS # BLD: 6.5 K/UL (ref 1.7–7.7)
NEUTROPHILS NFR BLD: 70.1 %
OPIATES UR QL SCN>300 NG/ML: NORMAL
OXYCODONE UR QL SCN: NORMAL
PCP UR QL SCN>25 NG/ML: NORMAL
PH UR STRIP: 6 [PH]
PLATELET # BLD AUTO: 190 K/UL (ref 135–450)
PMV BLD AUTO: 9.2 FL (ref 5–10.5)
RBC # BLD AUTO: 3.65 M/UL (ref 4–5.2)
REAGIN+T PALLIDUM IGG+IGM SERPL-IMP: NORMAL
WBC # BLD AUTO: 9.3 K/UL (ref 4–11)

## 2024-06-14 PROCEDURE — 80307 DRUG TEST PRSMV CHEM ANLYZR: CPT

## 2024-06-14 PROCEDURE — 2500000003 HC RX 250 WO HCPCS: Performed by: NURSE ANESTHETIST, CERTIFIED REGISTERED

## 2024-06-14 PROCEDURE — 85025 COMPLETE CBC W/AUTO DIFF WBC: CPT

## 2024-06-14 PROCEDURE — 3E0P7VZ INTRODUCTION OF HORMONE INTO FEMALE REPRODUCTIVE, VIA NATURAL OR ARTIFICIAL OPENING: ICD-10-PCS | Performed by: STUDENT IN AN ORGANIZED HEALTH CARE EDUCATION/TRAINING PROGRAM

## 2024-06-14 PROCEDURE — 6360000002 HC RX W HCPCS: Performed by: NURSE ANESTHETIST, CERTIFIED REGISTERED

## 2024-06-14 PROCEDURE — 1220000000 HC SEMI PRIVATE OB R&B

## 2024-06-14 PROCEDURE — 88307 TISSUE EXAM BY PATHOLOGIST: CPT

## 2024-06-14 PROCEDURE — 6360000002 HC RX W HCPCS: Performed by: STUDENT IN AN ORGANIZED HEALTH CARE EDUCATION/TRAINING PROGRAM

## 2024-06-14 PROCEDURE — 86900 BLOOD TYPING SEROLOGIC ABO: CPT

## 2024-06-14 PROCEDURE — 6360000002 HC RX W HCPCS: Performed by: OBSTETRICS & GYNECOLOGY

## 2024-06-14 PROCEDURE — 86780 TREPONEMA PALLIDUM: CPT

## 2024-06-14 PROCEDURE — 86901 BLOOD TYPING SEROLOGIC RH(D): CPT

## 2024-06-14 PROCEDURE — 10907ZC DRAINAGE OF AMNIOTIC FLUID, THERAPEUTIC FROM PRODUCTS OF CONCEPTION, VIA NATURAL OR ARTIFICIAL OPENING: ICD-10-PCS | Performed by: STUDENT IN AN ORGANIZED HEALTH CARE EDUCATION/TRAINING PROGRAM

## 2024-06-14 PROCEDURE — 51702 INSERT TEMP BLADDER CATH: CPT

## 2024-06-14 PROCEDURE — 6370000000 HC RX 637 (ALT 250 FOR IP): Performed by: OBSTETRICS & GYNECOLOGY

## 2024-06-14 PROCEDURE — 2580000003 HC RX 258: Performed by: OBSTETRICS & GYNECOLOGY

## 2024-06-14 PROCEDURE — 86850 RBC ANTIBODY SCREEN: CPT

## 2024-06-14 PROCEDURE — 7200000001 HC VAGINAL DELIVERY

## 2024-06-14 RX ORDER — ACETAMINOPHEN 325 MG/1
650 TABLET ORAL EVERY 4 HOURS PRN
Status: DISCONTINUED | OUTPATIENT
Start: 2024-06-14 | End: 2024-06-15

## 2024-06-14 RX ORDER — TRANEXAMIC ACID 10 MG/ML
1000 INJECTION, SOLUTION INTRAVENOUS
Status: DISCONTINUED | OUTPATIENT
Start: 2024-06-14 | End: 2024-06-15

## 2024-06-14 RX ORDER — DOCUSATE SODIUM 100 MG/1
100 CAPSULE, LIQUID FILLED ORAL 2 TIMES DAILY
Status: DISCONTINUED | OUTPATIENT
Start: 2024-06-14 | End: 2024-06-15

## 2024-06-14 RX ORDER — METHYLERGONOVINE MALEATE 0.2 MG/ML
200 INJECTION INTRAVENOUS PRN
Status: DISCONTINUED | OUTPATIENT
Start: 2024-06-14 | End: 2024-06-15

## 2024-06-14 RX ORDER — CARBOPROST TROMETHAMINE 250 UG/ML
250 INJECTION, SOLUTION INTRAMUSCULAR PRN
Status: DISCONTINUED | OUTPATIENT
Start: 2024-06-14 | End: 2024-06-15

## 2024-06-14 RX ORDER — SODIUM CHLORIDE, SODIUM LACTATE, POTASSIUM CHLORIDE, AND CALCIUM CHLORIDE .6; .31; .03; .02 G/100ML; G/100ML; G/100ML; G/100ML
500 INJECTION, SOLUTION INTRAVENOUS PRN
Status: DISCONTINUED | OUTPATIENT
Start: 2024-06-14 | End: 2024-06-15

## 2024-06-14 RX ORDER — BUPIVACAINE HYDROCHLORIDE 2.5 MG/ML
INJECTION, SOLUTION EPIDURAL; INFILTRATION; INTRACAUDAL PRN
Status: DISCONTINUED | OUTPATIENT
Start: 2024-06-14 | End: 2024-06-14 | Stop reason: SDUPTHER

## 2024-06-14 RX ORDER — ONDANSETRON 4 MG/1
4 TABLET, ORALLY DISINTEGRATING ORAL EVERY 6 HOURS PRN
Status: DISCONTINUED | OUTPATIENT
Start: 2024-06-14 | End: 2024-06-15

## 2024-06-14 RX ORDER — TERBUTALINE SULFATE 1 MG/ML
0.25 INJECTION, SOLUTION SUBCUTANEOUS
Status: DISCONTINUED | OUTPATIENT
Start: 2024-06-14 | End: 2024-06-15

## 2024-06-14 RX ORDER — EPHEDRINE SULFATE 50 MG/ML
INJECTION INTRAVENOUS
Status: DISCONTINUED
Start: 2024-06-14 | End: 2024-06-15

## 2024-06-14 RX ORDER — EPHEDRINE SULFATE 50 MG/ML
INJECTION, SOLUTION INTRAVENOUS PRN
Status: DISCONTINUED | OUTPATIENT
Start: 2024-06-14 | End: 2024-06-14 | Stop reason: SDUPTHER

## 2024-06-14 RX ORDER — SODIUM CHLORIDE, SODIUM LACTATE, POTASSIUM CHLORIDE, CALCIUM CHLORIDE 600; 310; 30; 20 MG/100ML; MG/100ML; MG/100ML; MG/100ML
INJECTION, SOLUTION INTRAVENOUS CONTINUOUS
Status: DISCONTINUED | OUTPATIENT
Start: 2024-06-14 | End: 2024-06-15

## 2024-06-14 RX ORDER — FERROUS SULFATE 325(65) MG
325 TABLET ORAL
Status: ON HOLD | COMMUNITY
End: 2024-06-16 | Stop reason: HOSPADM

## 2024-06-14 RX ORDER — SODIUM CHLORIDE 0.9 % (FLUSH) 0.9 %
5-40 SYRINGE (ML) INJECTION PRN
Status: DISCONTINUED | OUTPATIENT
Start: 2024-06-14 | End: 2024-06-15

## 2024-06-14 RX ORDER — SODIUM CHLORIDE 0.9 % (FLUSH) 0.9 %
5-40 SYRINGE (ML) INJECTION EVERY 12 HOURS SCHEDULED
Status: DISCONTINUED | OUTPATIENT
Start: 2024-06-14 | End: 2024-06-15

## 2024-06-14 RX ORDER — MISOPROSTOL 100 UG/1
400 TABLET ORAL PRN
Status: DISCONTINUED | OUTPATIENT
Start: 2024-06-14 | End: 2024-06-15

## 2024-06-14 RX ORDER — ONDANSETRON 2 MG/ML
4 INJECTION INTRAMUSCULAR; INTRAVENOUS EVERY 6 HOURS PRN
Status: DISCONTINUED | OUTPATIENT
Start: 2024-06-14 | End: 2024-06-15

## 2024-06-14 RX ORDER — SODIUM CHLORIDE 9 MG/ML
25 INJECTION, SOLUTION INTRAVENOUS PRN
Status: DISCONTINUED | OUTPATIENT
Start: 2024-06-14 | End: 2024-06-15

## 2024-06-14 RX ADMIN — Medication 2.5 MILLION UNITS: at 18:22

## 2024-06-14 RX ADMIN — Medication 1 MILLI-UNITS/MIN: at 16:04

## 2024-06-14 RX ADMIN — Medication 25 MCG: at 06:09

## 2024-06-14 RX ADMIN — SODIUM CHLORIDE, POTASSIUM CHLORIDE, SODIUM LACTATE AND CALCIUM CHLORIDE: 600; 310; 30; 20 INJECTION, SOLUTION INTRAVENOUS at 08:28

## 2024-06-14 RX ADMIN — Medication 87.3 MILLI-UNITS/MIN: at 21:23

## 2024-06-14 RX ADMIN — SODIUM CHLORIDE, POTASSIUM CHLORIDE, SODIUM LACTATE AND CALCIUM CHLORIDE: 600; 310; 30; 20 INJECTION, SOLUTION INTRAVENOUS at 05:50

## 2024-06-14 RX ADMIN — Medication 2.5 MILLION UNITS: at 10:03

## 2024-06-14 RX ADMIN — Medication 15 ML/HR: at 12:00

## 2024-06-14 RX ADMIN — EPHEDRINE SULFATE 40 MG: 50 INJECTION, SOLUTION INTRAVENOUS at 13:24

## 2024-06-14 RX ADMIN — Medication 2.5 MILLION UNITS: at 14:09

## 2024-06-14 RX ADMIN — SODIUM CHLORIDE, POTASSIUM CHLORIDE, SODIUM LACTATE AND CALCIUM CHLORIDE: 600; 310; 30; 20 INJECTION, SOLUTION INTRAVENOUS at 12:01

## 2024-06-14 RX ADMIN — DEXTROSE MONOHYDRATE 5 MILLION UNITS: 5 INJECTION INTRAVENOUS at 06:15

## 2024-06-14 RX ADMIN — BUPIVACAINE HYDROCHLORIDE 0.8 ML: 2.5 INJECTION, SOLUTION EPIDURAL; INFILTRATION; INTRACAUDAL; PERINEURAL at 11:43

## 2024-06-14 NOTE — FLOWSHEET NOTE
Cook's bhatia placed for induction by Dr. Dhaliwal. 80 ml/80ml NS inflated.  FHR variables with supine positioning.  Resolved with sidelying position.

## 2024-06-14 NOTE — FLOWSHEET NOTE
Dr. Dhaliwal reviewed fhr tracing.  Orders given to start Pitocin but to increase slowly,  no faster than 1 mu/30 minutes.  Patient verbalized understanding.

## 2024-06-14 NOTE — ANESTHESIA PROCEDURE NOTES
CSE Block    Patient location during procedure: OB  Start time: 6/14/2024 11:40 AM  End time: 6/14/2024 12:00 PM  Reason for block: labor epidural  Staffing  Performed: resident/CRNA   Resident/CRNA: Litzy Remy APRN - CRNA  Performed by: Litzy Remy APRN - CRNA  Authorized by: Litzy Remy APRN - CRNA    CSE  Patient position: sitting  Prep: ChloraPrep and site prepped and draped  Patient monitoring: continuous pulse ox and frequent blood pressure checks  Approach: midline  Provider prep: mask and sterile gloves  Spinal Needle  Needle type: pencil-tip   Needle gauge: 25 G  Needle length: 4.75 in  Epidural Needle  Injection technique: FRANSISCO saline  Needle type: Tuohy   Needle gauge: 17 G  Needle length: 3.5 in  Needle insertion depth: 4 cm  Location: lumbar (1-5)  Catheter  Catheter type: side hole  Catheter size: 19 G  Catheter at skin depth: 9 cm  Test dose: negative  Assessment  Hemodynamics: stable  Additional Notes  Patient in sitting position.  Sterile prep and drape applied to back.  Skin localization with 5ml of lidocaine 1%.  LORT with NS, CSE with 25g pencan- return of clear free flowing csf.  Epidural catheter advanced easily.  Test dose given incrementally after negative aspirate from catheter.  Sterile dressing applied.  Patient tolerated procedure well.  Preanesthetic Checklist  Completed: patient identified, IV checked, site marked, risks and benefits discussed, surgical/procedural consents, equipment checked, pre-op evaluation, timeout performed, anesthesia consent given, oxygen available, monitors applied/VS acknowledged and blood product R/B/A discussed and consented

## 2024-06-14 NOTE — PROGRESS NOTES
Labor Progress Note    S: Pt resting comfortably. Agrees to ICFB.     O:   Vitals:    24 1513   BP:    Pulse:    Resp:    Temp:    SpO2: 96%      FHT baseline 115 bpm, moderate variability, + acceleration, no decelerations  TOCO: Ctx q 4 min  Cervix: 2-3/50/-3  Cook ICFB 80/80 placed     A/P:  29 y.o.   OB History          2    Para   1    Term   1       0    AB   0    Living   1         SAB   0    IAB   0    Ectopic   0    Molar   0    Multiple   0    Live Births   1             40w2d  IOL- term  Fetal status- reassuring  Recheck Cervix in 4 or PRN  CEFM/TOCO  ICFB placed     Tri Dhaliwal MD  24

## 2024-06-14 NOTE — H&P
Range    C. Trachomatis, External Result Negative    N. Gonorrhoeae, External Result   Result Value Ref Range    N. Gonorrhoeae, External Result Negative    Hepatitis C Antibody, External Result   Result Value Ref Range    Hepatitis C Antibody, External Result Negative    HIV, External Result   Result Value Ref Range    HIV, External Result Negative    RPR, External Lab   Result Value Ref Range    RPR, External Result Non-reactive    Hepatitis B, External Result   Result Value Ref Range    Hep B, External Result Negative    Rubella Titer, External Result   Result Value Ref Range    Rubella Titer, External Result Immune    Rh Factor, External Result   Result Value Ref Range    Rh Factor, External Result Negative    ABO, External Result   Result Value Ref Range    ABO, External Result O          ASSESSMENT AND PLAN:    The patient is a 29 y.o.  2 parity 1001 at 40.2 weeks    Principal Problem:  2 vessel cord  Rh negative  Anxiety not on meds  Hx of ETOH abuse- sober  Hx spondylolisthesis  GBS positive  Reassuring fetal status      Plan:   1. Admit to L&D  2. Routine admission labs ordered.   3. Cytotec for cervical ripening ordered   4. Epidural prn pain management  5. PEN G for GBS prophylaxis  6. Monitor for labor progression    BRITTANY Diaz, DO  OB/GYN

## 2024-06-14 NOTE — ANESTHESIA PRE PROCEDURE
BP Readings from Last 3 Encounters:   06/14/24 (!) 88/54   03/22/24 103/63   05/06/22 (!) 87/56       NPO Status: Time of last liquid consumption: 0600                        Time of last solid consumption: 2100                        Date of last liquid consumption: 06/14/24                        Date of last solid food consumption: 06/13/24    BMI:   Wt Readings from Last 3 Encounters:   06/14/24 55.3 kg (122 lb)   03/22/24 52.6 kg (116 lb)   05/06/22 36.3 kg (80 lb)     Body mass index is 23.83 kg/m².    CBC:   Lab Results   Component Value Date/Time    WBC 9.3 06/14/2024 04:50 AM    RBC 3.65 06/14/2024 04:50 AM    HGB 10.7 06/14/2024 04:50 AM    HCT 32.7 06/14/2024 04:50 AM    MCV 89.6 06/14/2024 04:50 AM    RDW 16.0 06/14/2024 04:50 AM     06/14/2024 04:50 AM       CMP:   Lab Results   Component Value Date/Time     03/18/2022 10:20 PM    K 3.1 03/18/2022 10:20 PM     03/18/2022 10:20 PM    CO2 22 03/18/2022 10:20 PM    BUN 9 03/18/2022 10:20 PM    CREATININE <0.5 03/18/2022 10:20 PM    GFRAA >60 03/18/2022 10:20 PM    AGRATIO 1.5 03/18/2022 10:20 PM    LABGLOM >60 03/18/2022 10:20 PM    GLUCOSE 108 03/18/2022 10:20 PM    CALCIUM 8.5 03/18/2022 10:20 PM    BILITOT 0.3 03/18/2022 10:20 PM    ALKPHOS 84 03/18/2022 10:20 PM    AST 42 03/18/2022 10:20 PM    ALT 45 03/18/2022 10:20 PM       POC Tests: No results for input(s): \"POCGLU\", \"POCNA\", \"POCK\", \"POCCL\", \"POCBUN\", \"POCHEMO\", \"POCHCT\" in the last 72 hours.    Coags: No results found for: \"PROTIME\", \"INR\", \"APTT\"    HCG (If Applicable):   Lab Results   Component Value Date    PREGTESTUR Negative 03/18/2022    PREGSERUM Negative 03/03/2021        ABGs: No results found for: \"PHART\", \"PO2ART\", \"ZVW4WND\", \"WLC8ROI\", \"BEART\", \"X5GRNFZP\"     Type & Screen (If Applicable):  No results found for: \"LABABO\"    Drug/Infectious Status (If Applicable):  No results found for: \"HIV\", \"HEPCAB\"    COVID-19 Screening (If Applicable): No  results found for: \"COVID19\"        Anesthesia Evaluation  Patient summary reviewed and Nursing notes reviewed   history of anesthetic complications (states previous epidural 4 years ago ineffective due to equipment malfunction):   Airway: Mallampati: II  TM distance: >3 FB   Neck ROM: full  Mouth opening: > = 3 FB   Dental:          Pulmonary:Negative Pulmonary ROS                              Cardiovascular:  Exercise tolerance: good (>4 METS)           Beta Blocker:  Not on Beta Blocker      ROS comment: BP runs SBP high 80s/90s over 40s, HR 60-70.  States has had some intermittent dizziness, no LOC or fainting spells     Neuro/Psych:   (+) depression/anxiety              ROS comment: Low lumbar spondylisthesis.  Denies radiculopathy.   GI/Hepatic/Renal:   (+) liver disease (states has been exposed to Hep C but currently negative):          Endo/Other:    (+) blood dyscrasia: anemia:..                 Abdominal:             Vascular: negative vascular ROS.         Other Findings:             Anesthesia Plan      CSE     ASA 2             Anesthetic plan and risks discussed with patient.                        Litzy Remy, APRN - CRNA   6/14/2024

## 2024-06-14 NOTE — PROGRESS NOTES
Labor Progress Note    S: Pt resting comfortably    O:   Vitals:    24 0558   BP: (!) 99/55   Pulse: 71   Resp: 16   Temp: 98.1 °F (36.7 °C)   SpO2:       FHT baseline 110 bpm, moderate variability, + acceleration, late decelerations  TOCO: Ctx q 2-3 min  Cervix: 2/50/-3    A/P:  The patient is a 29 y.o.  2 parity 1001 at 40.2 weeks   IOL- term  Fetal status- cat2 repositioned and bolus resolved decels  Recheck Cervix in 2hr or PRN discussed ICFB  CEFM/TOCO  S/p 1 cytotec     Tri Dhaliwal MD  24

## 2024-06-14 NOTE — PROGRESS NOTES
Labor Progress Note    S: Pt resting comfortably. ICFB in vagina removed.     O:   Vitals:    24 1513   BP:    Pulse:    Resp:    Temp:    SpO2: 96%      FHT baseline 125 bpm, moderate variability, + acceleration, 2 late decelerations recovered with repositioning   TOCO: Ctx q 3-5 min  Cervix: 5/60/-3    A/P:  29 y.o.   OB History          2    Para   1    Term   1       0    AB   0    Living   1         SAB   0    IAB   0    Ectopic   0    Molar   0    Multiple   0    Live Births   1             40w2d  IOL- term  Fetal status- cat 2, reposition and cat 1  Recheck Cervix in 4 or PRN  CEFM/TOCO  Start pitocin once cat 1 and go slow    Tri Dhaliwal MD  24    -

## 2024-06-14 NOTE — PROGRESS NOTES
PT presented to OB triage unit with complaints of pelvic pain starting around 2130, followed by contractions starting around 2300.  PT ambulated to T1. Oriented to room and telephone. Pt provided urine sample and was placed on EFM. Pt states she is feeling the baby move. Denies any leaking of fluid or vaginal bleeding. PT denied recent sexual intercourse. Denies any additional pain/concerns at this time.

## 2024-06-14 NOTE — PROGRESS NOTES
Call placed to Dr. Diaz at this time to notify of pt arrival to triage and SVE 2/50/-2. Plan to recheck SVE in two hours. DO aware of low fetal baseline, but otherwise reactive FHRT.

## 2024-06-14 NOTE — FLOWSHEET NOTE
Fetal heart rate, contraction pattern and maternal response to labor assessed no less than every 15 minutes per bedside monitor, central monitoring and palpation.  FHR, contraction pattern and maternal response to labor documentation every 30 minutes and as needed per protocol.

## 2024-06-15 LAB
DEPRECATED RDW RBC AUTO: 15.7 % (ref 12.4–15.4)
HCT VFR BLD AUTO: 32.5 % (ref 36–48)
HGB BLD-MCNC: 10.5 G/DL (ref 12–16)
MCH RBC QN AUTO: 29 PG (ref 26–34)
MCHC RBC AUTO-ENTMCNC: 32.4 G/DL (ref 31–36)
MCV RBC AUTO: 89.5 FL (ref 80–100)
PLATELET # BLD AUTO: 178 K/UL (ref 135–450)
PMV BLD AUTO: 8.9 FL (ref 5–10.5)
RBC # BLD AUTO: 3.63 M/UL (ref 4–5.2)
WBC # BLD AUTO: 12.7 K/UL (ref 4–11)

## 2024-06-15 PROCEDURE — 6370000000 HC RX 637 (ALT 250 FOR IP): Performed by: STUDENT IN AN ORGANIZED HEALTH CARE EDUCATION/TRAINING PROGRAM

## 2024-06-15 PROCEDURE — 2580000003 HC RX 258: Performed by: STUDENT IN AN ORGANIZED HEALTH CARE EDUCATION/TRAINING PROGRAM

## 2024-06-15 PROCEDURE — 85027 COMPLETE CBC AUTOMATED: CPT

## 2024-06-15 PROCEDURE — 1220000000 HC SEMI PRIVATE OB R&B

## 2024-06-15 PROCEDURE — 36415 COLL VENOUS BLD VENIPUNCTURE: CPT

## 2024-06-15 RX ORDER — DOCUSATE SODIUM 100 MG/1
100 CAPSULE, LIQUID FILLED ORAL 2 TIMES DAILY
Status: DISCONTINUED | OUTPATIENT
Start: 2024-06-15 | End: 2024-06-16 | Stop reason: HOSPADM

## 2024-06-15 RX ORDER — SODIUM CHLORIDE, SODIUM LACTATE, POTASSIUM CHLORIDE, AND CALCIUM CHLORIDE .6; .31; .03; .02 G/100ML; G/100ML; G/100ML; G/100ML
500 INJECTION, SOLUTION INTRAVENOUS ONCE
Status: COMPLETED | OUTPATIENT
Start: 2024-06-15 | End: 2024-06-15

## 2024-06-15 RX ORDER — SODIUM CHLORIDE 0.9 % (FLUSH) 0.9 %
5-40 SYRINGE (ML) INJECTION EVERY 12 HOURS SCHEDULED
Status: DISCONTINUED | OUTPATIENT
Start: 2024-06-15 | End: 2024-06-16 | Stop reason: HOSPADM

## 2024-06-15 RX ORDER — SIMETHICONE 80 MG
80 TABLET,CHEWABLE ORAL EVERY 6 HOURS PRN
Status: DISCONTINUED | OUTPATIENT
Start: 2024-06-15 | End: 2024-06-16 | Stop reason: HOSPADM

## 2024-06-15 RX ORDER — LANOLIN 100 %
OINTMENT (GRAM) TOPICAL PRN
Status: DISCONTINUED | OUTPATIENT
Start: 2024-06-15 | End: 2024-06-16 | Stop reason: HOSPADM

## 2024-06-15 RX ORDER — FAMOTIDINE 20 MG/1
20 TABLET, FILM COATED ORAL 2 TIMES DAILY
Status: DISCONTINUED | OUTPATIENT
Start: 2024-06-15 | End: 2024-06-16 | Stop reason: HOSPADM

## 2024-06-15 RX ORDER — ONDANSETRON 4 MG/1
4 TABLET, ORALLY DISINTEGRATING ORAL EVERY 6 HOURS PRN
Status: DISCONTINUED | OUTPATIENT
Start: 2024-06-15 | End: 2024-06-16 | Stop reason: HOSPADM

## 2024-06-15 RX ORDER — SODIUM CHLORIDE 9 MG/ML
INJECTION, SOLUTION INTRAVENOUS PRN
Status: DISCONTINUED | OUTPATIENT
Start: 2024-06-15 | End: 2024-06-16 | Stop reason: HOSPADM

## 2024-06-15 RX ORDER — FERROUS SULFATE 325(65) MG
325 TABLET ORAL EVERY OTHER DAY
Status: DISCONTINUED | OUTPATIENT
Start: 2024-06-15 | End: 2024-06-16 | Stop reason: HOSPADM

## 2024-06-15 RX ORDER — SODIUM CHLORIDE, SODIUM LACTATE, POTASSIUM CHLORIDE, CALCIUM CHLORIDE 600; 310; 30; 20 MG/100ML; MG/100ML; MG/100ML; MG/100ML
INJECTION, SOLUTION INTRAVENOUS CONTINUOUS
Status: DISCONTINUED | OUTPATIENT
Start: 2024-06-15 | End: 2024-06-15

## 2024-06-15 RX ORDER — SODIUM CHLORIDE 0.9 % (FLUSH) 0.9 %
5-40 SYRINGE (ML) INJECTION PRN
Status: DISCONTINUED | OUTPATIENT
Start: 2024-06-15 | End: 2024-06-16 | Stop reason: HOSPADM

## 2024-06-15 RX ORDER — ACETAMINOPHEN 500 MG
1000 TABLET ORAL EVERY 8 HOURS SCHEDULED
Status: DISCONTINUED | OUTPATIENT
Start: 2024-06-15 | End: 2024-06-16 | Stop reason: HOSPADM

## 2024-06-15 RX ORDER — IBUPROFEN 800 MG/1
800 TABLET ORAL EVERY 8 HOURS SCHEDULED
Status: DISCONTINUED | OUTPATIENT
Start: 2024-06-15 | End: 2024-06-16 | Stop reason: HOSPADM

## 2024-06-15 RX ORDER — ONDANSETRON 2 MG/ML
4 INJECTION INTRAMUSCULAR; INTRAVENOUS EVERY 6 HOURS PRN
Status: DISCONTINUED | OUTPATIENT
Start: 2024-06-15 | End: 2024-06-16 | Stop reason: HOSPADM

## 2024-06-15 RX ADMIN — ACETAMINOPHEN 1000 MG: 500 TABLET ORAL at 13:15

## 2024-06-15 RX ADMIN — ACETAMINOPHEN 1000 MG: 500 TABLET ORAL at 21:38

## 2024-06-15 RX ADMIN — IBUPROFEN 800 MG: 800 TABLET, FILM COATED ORAL at 00:28

## 2024-06-15 RX ADMIN — IBUPROFEN 800 MG: 800 TABLET, FILM COATED ORAL at 16:52

## 2024-06-15 RX ADMIN — ACETAMINOPHEN 1000 MG: 500 TABLET ORAL at 03:34

## 2024-06-15 RX ADMIN — BENZOCAINE AND LEVOMENTHOL: 200; 5 SPRAY TOPICAL at 02:00

## 2024-06-15 RX ADMIN — IBUPROFEN 800 MG: 800 TABLET, FILM COATED ORAL at 08:50

## 2024-06-15 RX ADMIN — SODIUM CHLORIDE, POTASSIUM CHLORIDE, SODIUM LACTATE AND CALCIUM CHLORIDE 500 ML: 600; 310; 30; 20 INJECTION, SOLUTION INTRAVENOUS at 07:05

## 2024-06-15 ASSESSMENT — PAIN DESCRIPTION - DESCRIPTORS
DESCRIPTORS: SORE
DESCRIPTORS: SORE

## 2024-06-15 ASSESSMENT — PAIN SCALES - GENERAL
PAINLEVEL_OUTOF10: 5
PAINLEVEL_OUTOF10: 6
PAINLEVEL_OUTOF10: 5
PAINLEVEL_OUTOF10: 6

## 2024-06-15 ASSESSMENT — PAIN DESCRIPTION - LOCATION
LOCATION: BACK
LOCATION: ABDOMEN;BACK

## 2024-06-15 ASSESSMENT — PAIN - FUNCTIONAL ASSESSMENT
PAIN_FUNCTIONAL_ASSESSMENT: ACTIVITIES ARE NOT PREVENTED
PAIN_FUNCTIONAL_ASSESSMENT: ACTIVITIES ARE NOT PREVENTED

## 2024-06-15 ASSESSMENT — PAIN DESCRIPTION - ORIENTATION
ORIENTATION: LOWER
ORIENTATION: LOWER

## 2024-06-15 NOTE — PROGRESS NOTES
RN remained at bedside throughout pushing.  EFM continuously assessed.  Vaginal delivery of viable male infant.

## 2024-06-15 NOTE — L&D DELIVERY NOTE
Date: 24     Procedure:     Admission diagnosis: 29 y.o.  @ 40w2d    Primary surgeon: Tri Dhaliwal MD    Findings: male infant, Apgars 8/9, EFW pending    EBL: 50 mL    Complications: None     Laceration: right labial hemostatic not repaired     Patient was induced electively at 40.2 wks. She received 1 dose of cytotec followed by ICFB and pitocin. She had AROM, received an epidural and became complete.   She was placed in the dorsal lithotomy position, prepped and draped in the normal sterile fashion. She pushed effectively and there was delivery of the head in the ROBERTO position, double nucal cord reduced, followed by the ant/posterior shoulder, and the remainder of the infant delivered without difficulty. Infant was placed on mother's abdomen. 60 second delayed cord clamping. The cord was doubly clamped. The father of the baby cut the cord. Cord blood obtained. The 2 VC intact placenta spontaneously delivered. Pitocin started. Fundal massage was performed until the fundus was firm. Inspection of the vagina showed superficial right labial laceration hemostatic and not repaired. No lacerations were noted on the cervix.   Hemostasis noted from the cervix and the fundus was firm.    Mother and infant were stable in recovery.  Sponge counts were correct before and following the procedure.     I was present for the entire procedure.     Tri Dhaliwal MD      Lopez Leon Stella [[id number]]      Labor Events     Labor: No   Steroids: None  Cervical Ripening Date/Time:  24 06:10:00   Cervical Ripening Type: Misoprostol, De Leon/EASI  Antibiotics Received during Labor: Yes  Rupture Date/Time:  24 19:41:00   Rupture Type: AROM, Intact, Bulging  Fluid Color: Clear  Fluid Odor: None              Labor Event Times      Labor onset date/time:  24 10:30:00 EDT     Dilation complete date/time:        Start pushing date/time:     Decision date/time (emergent ):

## 2024-06-15 NOTE — PROGRESS NOTES
Department of Obstetrics and Gynecology  Labor and Delivery  Post Partum Progress Note      SUBJECTIVE:  29 y.o. yo  PPD # 1 s/p .  Pain is well controlled.  Reports lochia is moderate. Denies passing clots or needing to change pads freqeuntly.  Tolerating po, ambulating, voiding without difficulty. Denies feeling dizzy or lightheaded. Breast and bottlefeeding.     OBJECTIVE:      Vitals:  BP (!) 88/58   Pulse 70   Temp 97.9 °F (36.6 °C) (Oral)   Resp 18   Wt 55.3 kg (122 lb)   LMP 2023 (Exact Date)   SpO2 97%   Breastfeeding Unknown   BMI 23.83 kg/m²     CONSTITUTIONAL:  awake, alert, cooperative, no apparent distress, and appears stated age  ABDOMEN:  soft, NT, ND, fundus below umbilicus  CHEST/BREASTS:  no increased work of breathing  MUSCULOSKELETAL:  nontender legs, no LE edema    DATA:    WBC/Hgb/Hct/Plts:  12.7/10.5/32.5/178 (06/15 0635)     ASSESSMENT & PLAN:      Stella Lopez 28 yo  PPD#1,  2121  - Hypotensions this am, pt asymptomatic, Hgb stable 10.5  - Given 500 cc LR bolus, encouraged to increase hydration  - Regular diet  - Continue routine postpartum care  - Rh negative- infant blood type B negative, rhogam not indicated  - Desires circumcision of male infant - d/w pt. risks/benefits/alternatives/expected outcomes of circumcision,  questions answered, mother consents for procedure to be performed. Will perform following neonatology clearance.

## 2024-06-15 NOTE — PROGRESS NOTES
First time get up to BR w/ assist x 2 RN's. Pt voided 350 cc urine without difficulty. Pt performed trever care per self after instruction. New ice pack, peripad, and underwear provided. Pt back to bed w/ out incident, gait steady. Pt tolerated well.

## 2024-06-15 NOTE — ANESTHESIA POSTPROCEDURE EVALUATION
Department of Anesthesiology  Postprocedure Note    Patient: Stella Lopez  MRN: [id number]  YOB: 1995  Date of evaluation: 6/15/2024    Procedure Summary       Date: 06/14/24 Room / Location:     Anesthesia Start: 1130 Anesthesia Stop: 2121    Procedure: Labor Analgesia Diagnosis:     Scheduled Providers:  Responsible Provider: Mina Rangel MD    Anesthesia Type: CSE ASA Status: 2            Anesthesia Type: No value filed.    Juanita Phase I: Juanita Score: 9    Juanita Phase II: Juanita Score: 9    Anesthesia Post Evaluation    Patient location during evaluation: bedside  Patient participation: complete - patient participated  Level of consciousness: awake and alert  Pain score: 1  Airway patency: patent  Nausea & Vomiting: no nausea and no vomiting  Cardiovascular status: blood pressure returned to baseline  Respiratory status: acceptable  Hydration status: euvolemic  Pain management: adequate        No notable events documented.

## 2024-06-15 NOTE — PROGRESS NOTES
Labor Progress Note    S: Pt resting comfortably. Discussed rupture with pt and agreed. AROM clear     O:   Vitals:    24   BP: 120/67   Pulse: 62   Resp: 16   Temp: 98.6 °F (37 °C)   SpO2:       FHT baseline 120 bpm, moderate variability, + acceleration, variable decelerations  TOCO: Ctx q 3-5 min  Cervix: 8/90/-1    A/P:  29 y.o.   OB History          2    Para   1    Term   1       0    AB   0    Living   1         SAB   0    IAB   0    Ectopic   0    Molar   0    Multiple   0    Live Births   1             40w2d  IOL- term  Fetal status- cat 2 repositioned and pit off (on 3)  Recheck Cervix in 1hr or PRN  CEFM/TOCO    Tri Dhaliwal MD  24

## 2024-06-16 VITALS
RESPIRATION RATE: 16 BRPM | BODY MASS INDEX: 23.83 KG/M2 | SYSTOLIC BLOOD PRESSURE: 101 MMHG | DIASTOLIC BLOOD PRESSURE: 60 MMHG | WEIGHT: 122 LBS | TEMPERATURE: 98.8 F | OXYGEN SATURATION: 98 % | HEART RATE: 73 BPM

## 2024-06-16 PROBLEM — R76.8 HEPATITIS C ANTIBODY POSITIVE IN BLOOD: Status: ACTIVE | Noted: 2024-06-16

## 2024-06-16 PROBLEM — Z86.59 HISTORY OF ANXIETY: Status: ACTIVE | Noted: 2024-06-16

## 2024-06-16 PROBLEM — Z67.91 RH NEGATIVE STATE IN ANTEPARTUM PERIOD, THIRD TRIMESTER: Status: ACTIVE | Noted: 2024-06-16

## 2024-06-16 PROBLEM — O26.893 RH NEGATIVE STATE IN ANTEPARTUM PERIOD, THIRD TRIMESTER: Status: ACTIVE | Noted: 2024-06-16

## 2024-06-16 PROCEDURE — 6370000000 HC RX 637 (ALT 250 FOR IP): Performed by: STUDENT IN AN ORGANIZED HEALTH CARE EDUCATION/TRAINING PROGRAM

## 2024-06-16 RX ORDER — ACETAMINOPHEN 500 MG
1000 TABLET ORAL EVERY 6 HOURS PRN
Qty: 56 TABLET | Refills: 0 | Status: SHIPPED | OUTPATIENT
Start: 2024-06-16 | End: 2024-06-23

## 2024-06-16 RX ORDER — IBUPROFEN 800 MG/1
800 TABLET ORAL EVERY 8 HOURS PRN
Qty: 21 TABLET | Refills: 0 | Status: SHIPPED | OUTPATIENT
Start: 2024-06-16 | End: 2024-06-23

## 2024-06-16 RX ADMIN — DOCUSATE SODIUM 100 MG: 100 CAPSULE, LIQUID FILLED ORAL at 10:23

## 2024-06-16 RX ADMIN — IBUPROFEN 800 MG: 800 TABLET, FILM COATED ORAL at 10:23

## 2024-06-16 NOTE — DISCHARGE INSTRUCTIONS
Thank you for the opportunity to care for you and your family.    We hope that you are happy with the care we provided during your stay in the [facility]. We want to ensure that you have the help you need when you leave the hospital. If there is anything we can assist you with, please let us know.    Breastfeeding mothers may contact our lactation specialists with any problems or questions.  [facility] lact[facility] services phone number is [phone].  Leave a message and your call will be returned.    Please refer to the information provided in the postpartum care booklet.  The following are warning signs to remember.    Call 911 if you have:    Chest pain or pressure  Shortness of breath, even at rest  Thoughts of harming yourself or others  Seizures    Call your healthcare provider if you have:    Temperature of 100.4 degrees or higher  Stitches that are not healing        -- Swelling, bleeding, drainage, foul odor, redness or warmth in/around your           stitches, staples, or incision (scar)        -- Bad smelling blood or discharge from the vagina  Vaginal bleeding that has increased         -- Soaking through one pad in an hour        -- You are passing clots larger than the size of a lemon  Red, warm tender area(s) in your breast or calf  Headache that does not get better, even after taking medicine; or headache with vision changes    Remember to notify all healthcare providers from your date of delivery to up to one year after giving birth!    CARING FOR YOURSELF        DIET/ACTIVITY    Eat a well balanced diet focusing on foods high in fiber and protein.  Drink plenty of fluids, especially water.  To avoid constipation you may take a mild stool softener as recommended by your doctor or midwife.  Gradually increase your activity. Resume an exercise regime only after being advised by your doctor or midwife.  When sitting or lying down, keep your legs elevated to reduce swelling.  Avoid

## 2024-06-16 NOTE — PROGRESS NOTES
Postpartum and infant care teaching completed. Copy given to patient. Patient verbalized understanding of all teaching points. Pt cleared by  after checking with [facility]. Patient plans to follow-up with OB Provider and pediatrician as instructed.  Patient verbalizes understanding of discharge instructions and denies further questions.  ID bands checked. Mother's ID band and one of baby's ID bands removed and taped to sheet. Patient discharged in stable condition accompanied by FOB. Discharged in wheelchair, holding baby in arms.

## 2024-06-16 NOTE — DISCHARGE SUMMARY
Obstetric Discharge Summary    Admitting Diagnosis    IUP at 40w2d  Induction labor following late fetal decelerations   2 vessel umbilical cord  Rh negative  HCV +, RNA undetectable  History of anxiety  History of alcohol abuse    OB History          2    Para   2    Term   2       0    AB   0    Living   2         SAB   0    IAB   0    Ectopic   0    Molar   0    Multiple   0    Live Births   2                Reasons for Admission on 2024  3:14 AM  Normal labor [O80, Z37.9]  No comment available  Induction of Labor    Prenatal Procedures  None    Intrapartum Procedures        Multiple birth?: No        Spontaneous Vaginal Delivery: See Labor and Delivery Summary       Postpartum Procedures  None    Postpartum/Operative Complications       [place] Data  Information for the patient's :  Leon Lopez [[id number]]   male   Birth Weight: 2.981 kg (6 lb 9.2 oz)   Discharge With Mother  Complications: No    Discharge Diagnosis   IUP at 40w2d  Induction labor following late fetal decelerations   2 vessel umbilical cord  Rh negative  HCV +, RNA undetectable  History of anxiety  History of alcohol abuse  Delivery male infant weighing 6 lb 9 oz with APGARs 8 and 9 at 1 and 5 minutes    Discharge Information  Current Discharge Medication List        START taking these medications    Details   acetaminophen (TYLENOL) 500 MG tablet Take 2 tablets by mouth every 6 hours as needed for Pain  Qty: 56 tablet, Refills: 0      ibuprofen (ADVIL;MOTRIN) 800 MG tablet Take 1 tablet by mouth every 8 hours as needed for Pain  Qty: 21 tablet, Refills: 0           CONTINUE these medications which have NOT CHANGED    Details   Prenatal MV-Min-Fe Fum-FA-DHA (PRENATAL 1 PO) Take 1 tablet by mouth daily      medical marijuana Take by mouth as needed.           STOP taking these medications       ferrous sulfate (IRON 325) 325 (65 Fe) MG tablet Comments:   Reason for Stopping:               No discharge procedures
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
## 2024-06-16 NOTE — LACTATION NOTE
This note was copied from a baby's chart.  LACTATION CONSULTATION  Initial Lactation Consult:  Referred by: RN request and Census- feeding preference     Name: Leon Lopez       MRN: [id number]         YOB: 2022   Time of Birth: 9:21 PM   Gestational age: Gestational Age: 40w2d   Birth Weight: Birth Weight: 2.981 kg (6 lb 9.2 oz) Most Recent Weight: Weight: 2.981 kg (6 lb 9.2 oz) (Filed from Delivery Summary)   Weight Change from Birth: 0%           Maternal Assessment:  Maternal Data:  Information for the patient's mother:  Stella Lopez [[id number]]   27 y.o.   /Para:   Information for the patient's mother:  Stella Lopez [[id number]]      Information for the patient's mother:  Stella Lopez [[id number]]   40w2d     Prenatal Breastfeeding Education: Self Educations     Prior Breastfeeding Experience: Prior attempt Per MOB first baby only latch x1 in the hospital. Did not start pumping right away and then tried to pump at home.    Breastfeeding Goal: Unsure/Undecided  MOB reports to LC during visit she is unsure how long or if she will continue breastfeeding     Breast Assessment  Right Breast: WDL  Right Nipple: Everts well  and Small  Right Areola: Small   Right Nipple Comfort: comfortable   Right Nipple Integrity: Intact    Left Breast: WDL  Left Nipple: Everts well  and Small  Left Areola: Small   Left Nipple Comfort: comfortable   Left Nipple Integrity: Intact    Medications of Concern:None    Maternal Toxicology:   Information for the patient's mother:  Stella Lopez [[id number]]     Barbiturate Screen, Ur   Date Value Ref Range Status   2024 Neg Negative <200 ng/mL Final     Benzodiazepine Screen, Urine   Date Value Ref Range Status   2024 Neg Negative <200 ng/mL Final     Cannabinoid Scrn, Ur   Date Value Ref Range Status   2024 Neg Negative <50 ng/mL Final     Cocaine Metabolite Screen, Urine   Date Value Ref Range Status   2024 Neg 
This note was copied from a baby's chart.  Lactation Progress Note      Data:    F/U consult for multip on day 1 pp with an infant born at 40.2 weeks gestation. MOB breast fed her first for 2 weeks, states she has sensitive nipples & struggled with latching so she switched to formula. MOB reports current infant has been latching and feeding well, denies pain or soreness.          Action:    Introduced self to patient as lactation, name and phone number written on white board in room. Reviewed breastfeeding education, how the breasts work to make milk & protecting milk supply, what to expect with cluster feeding, and infant feeding cues. Encouraged mother to allow infant to breast feed on demand anytime feeding cues are shown and if no feeding cues are shown to attempt to wake infant to feed every 2-3 hours with a minimum of 8-12 feeds a day per 24 hour period.     Also encouraged mother to avoid giving infant a pacifier, bottle, or pump for at least the first two weeks of life or until breast feeding is well established. Encouraged good hydration, nutrition, and rest, and to keep taking prenatal or multivitamin while lactating. Encouraged much skin to skin. Breast feeding log reviewed, all questions answered. Mother encouraged to call lactation for F/U care as needed.     Response:    MOB verbalized an understanding of education provided and will call for assistance as needed.  
clinically  indicated.       pH, UA   Date Value Ref Range Status   03/22/2024 6.5 5.0 - 8.0 Final     Drug Screen Comment:   Date Value Ref Range Status   06/14/2024 see below  Final     Comment:     This method is a screening test to detect only these drug  classes as part of a medical workup.  Confirmatory testing  by another method should be ordered if clinically indicated.          Delivery Information:  Born on 6/14/2022 at 9:21 PM  Delivery method: Vaginal, Spontaneous [250]  Additional Information:  Forceps attempted? No [0]  Vacuum extractor attempted? No [0]  Breech:   Complications:           Pump Arrangements:  Instructed on use of pump for home     Education:  Discharge breastfeeding education  and Feeding plan     Action/Plan:  Breastfeed on cue and at least 8 times/24 hours, unlimited timing. May not nurse this often in the first 24 hours. Wake baby and offer breastfeeding if it has been 3 hours since the beginning of last feeding. Place infant skin to skin if infant will not breastfeed at 3 hours.   Hand express prior to latch to arron nipple and entice infant to the breast.   It is important to use gentle stimulation during feeding to promote active eating. Offer both breasts at every feeding. Burp infant in between sides. Alternate which breast is used first.   Offer STS often while awake. Mother holding infant skin to skin between feedings will promote milk supply and allow infant to rest more deeply.   Maintain a feeding log until infant is gaining weight and seen by primary care physician.   Request breastfeeding assistance from LC or RN as needed.     Feeding Plan reviewed with: Mom    Response:   Verbalized understanding of education and instruction and Pleased

## 2024-06-16 NOTE — PLAN OF CARE
Problem: Pain  Goal: Verbalizes/displays adequate comfort level or baseline comfort level  Outcome: Progressing     Problem: Postpartum  Goal: Experiences normal postpartum course  Description:  Postpartum OB-Pregnancy care plan goal which identifies if the mother is experiencing a normal postpartum course  Outcome: Progressing  Goal: Appropriate maternal -  bonding  Description:  Postpartum OB-Pregnancy care plan goal which identifies if the mother and  are bonding appropriately  Outcome: Progressing  Goal: Establishment of infant feeding pattern  Description:  Postpartum OB-Pregnancy care plan goal which identifies if the mother is establishing a feeding pattern with their   Outcome: Progressing     Problem: Infection - Adult  Goal: Absence of infection during hospitalization  Outcome: Progressing     Problem: Safety - Adult  Goal: Free from fall injury  Outcome: Progressing     Problem: Chronic Conditions and Co-morbidities  Goal: Patient's chronic conditions and co-morbidity symptoms are monitored and maintained or improved  Outcome: Progressing     
  Problem: Pain  Goal: Verbalizes/displays adequate comfort level or baseline comfort level  Outcome: Progressing     Problem: Vaginal Birth or  Section  Goal: Fetal and maternal status remain reassuring during the birth process  Description:  Birth OB-Pregnancy care plan goal which identifies if the fetal and maternal status remain reassuring during the birth process  Outcome: Progressing     Problem: Postpartum  Goal: Experiences normal postpartum course  Description:  Postpartum OB-Pregnancy care plan goal which identifies if the mother is experiencing a normal postpartum course  Outcome: Progressing  Goal: Appropriate maternal -  bonding  Description:  Postpartum OB-Pregnancy care plan goal which identifies if the mother and  are bonding appropriately  Outcome: Progressing  Goal: Establishment of infant feeding pattern  Description:  Postpartum OB-Pregnancy care plan goal which identifies if the mother is establishing a feeding pattern with their   Outcome: Progressing  Goal: Incisions, wounds, or drain sites healing without S/S of infection  Outcome: Progressing     Problem: Infection - Adult  Goal: Absence of infection at discharge  Outcome: Progressing  Goal: Absence of infection during hospitalization  Outcome: Progressing  Goal: Absence of fever/infection during anticipated neutropenic period  Outcome: Progressing     Problem: Safety - Adult  Goal: Free from fall injury  2024 1929 by Gretta Whittaker, RN  Outcome: Progressing  2024 0752 by Yesenia Ramos RN  Outcome: Progressing     Problem: Discharge Planning  Goal: Discharge to home or other facility with appropriate resources  Outcome: Progressing     Problem: Chronic Conditions and Co-morbidities  Goal: Patient's chronic conditions and co-morbidity symptoms are monitored and maintained or improved  Outcome: Progressing     
improved  6/15/2024 2109 by Era Malik, RN  Outcome: Progressing  6/15/2024 1752 by Aura Lund, RN  Outcome: Progressing     
injury  6/14/2024 2148 by Gretta Whittaker RN  Outcome: Progressing  6/14/2024 1929 by Gretta Whittaker RN  Outcome: Progressing  6/14/2024 0752 by Yesenia Ramos RN  Outcome: Progressing     Problem: Discharge Planning  Goal: Discharge to home or other facility with appropriate resources  6/14/2024 2148 by Gretta Whittaker RN  Outcome: Progressing  6/14/2024 1929 by Gretta Whittaker RN  Outcome: Progressing     Problem: Chronic Conditions and Co-morbidities  Goal: Patient's chronic conditions and co-morbidity symptoms are monitored and maintained or improved  6/14/2024 2148 by Gretta Whittaker RN  Outcome: Progressing  6/14/2024 1929 by Gretta Whittaker RN  Outcome: Progressing

## 2024-06-16 NOTE — PROGRESS NOTES
Discharge Phone Call    Patient Name: Stella Lopez     OB Care Provider: Liana Diaz DO Discharge Date: 2024    Disposition of baby:    Phone Number: [phone] (home)     Attempts to Contact:  Date:    Caller  Date:    Caller  Date:    Caller    Information for the patient's :  Leon Lopez [[id number]]   Delivery Method: Vaginal, Spontaneous     1.  Now that you are at home is your pain being well controlled?   Y/N   If no, instruct to call       provider.      2. Are you breastfeeding?    Y/N    Do you need any extra support from our lactation staff?      Y/N    If yes, provide number for lactation.  3. Have you made or already had your first appointment with the baby's doctor? Y/N   If no, do      you know when to schedule it?  Y/N    4. Have you scheduled your follow-up appointment?  Y/N  If no, do you know when to schedule       it?    Y/N   If no, they can find it on printed discharge instructions.  5. Did staff discuss safe sleep during your stay? Y/N   6. Did we explain things in a way you could understand?  Y/N  7. Were we respectful of your preferences for labor and birth and include you in the plan of       care?  Y/N  If no, please explain _______________________________________________  8. Is there anyone in particular you would like to mention who provided care for you? _______      _________________________________________________________________________     9. Were you given a Post-Birth Warning Signs handout?  Y/N  Do you have it somewhere      easily accessible?  Y/N  If no, please send them a copy and ask them to put it somewhere      easily found.  10. Have you been crying excessively, having anger or mood swings that feel out of control, or       feel like you can't cope with caring for yourself or baby? Y/N   If yes, they may be showing       signs of postpartum depression and should call provider. There is also a        depression test on page C5 in their